# Patient Record
Sex: FEMALE | Race: WHITE | NOT HISPANIC OR LATINO | Employment: FULL TIME | URBAN - METROPOLITAN AREA
[De-identification: names, ages, dates, MRNs, and addresses within clinical notes are randomized per-mention and may not be internally consistent; named-entity substitution may affect disease eponyms.]

---

## 2018-04-03 ENCOUNTER — OFFICE VISIT CONVERTED (OUTPATIENT)
Dept: SURGERY | Facility: CLINIC | Age: 48
End: 2018-04-03
Attending: SURGERY

## 2018-06-11 ENCOUNTER — OFFICE VISIT CONVERTED (OUTPATIENT)
Dept: SURGERY | Facility: CLINIC | Age: 48
End: 2018-06-11
Attending: SURGERY

## 2018-06-11 ENCOUNTER — CONVERSION ENCOUNTER (OUTPATIENT)
Dept: SURGERY | Facility: CLINIC | Age: 48
End: 2018-06-11

## 2019-12-10 ENCOUNTER — HOSPITAL ENCOUNTER (OUTPATIENT)
Dept: GENERAL RADIOLOGY | Facility: HOSPITAL | Age: 49
Discharge: HOME | End: 2019-12-10

## 2021-05-16 VITALS — RESPIRATION RATE: 16 BRPM | BODY MASS INDEX: 44.41 KG/M2 | HEIGHT: 68 IN | WEIGHT: 293 LBS

## 2021-05-16 VITALS — BODY MASS INDEX: 44.41 KG/M2 | HEIGHT: 68 IN | RESPIRATION RATE: 16 BRPM | WEIGHT: 293 LBS

## 2021-12-16 ENCOUNTER — TRANSCRIBE ORDERS (OUTPATIENT)
Dept: ADMINISTRATIVE | Facility: HOSPITAL | Age: 51
End: 2021-12-16

## 2021-12-16 DIAGNOSIS — R91.1 LUNG NODULE: Primary | ICD-10-CM

## 2021-12-27 ENCOUNTER — HOSPITAL ENCOUNTER (OUTPATIENT)
Dept: CT IMAGING | Facility: HOSPITAL | Age: 51
Discharge: HOME | End: 2021-12-27
Admitting: NURSE PRACTITIONER

## 2021-12-27 DIAGNOSIS — R91.1 LUNG NODULE: ICD-10-CM

## 2021-12-27 PROCEDURE — 71250 CT THORAX DX C-: CPT

## 2022-06-10 ENCOUNTER — TRANSCRIBE ORDERS (OUTPATIENT)
Dept: ADMINISTRATIVE | Facility: HOSPITAL | Age: 52
End: 2022-06-10

## 2022-06-10 DIAGNOSIS — R10.11 RIGHT UPPER QUADRANT PAIN: Primary | ICD-10-CM

## 2022-06-21 ENCOUNTER — HOSPITAL ENCOUNTER (OUTPATIENT)
Dept: ULTRASOUND IMAGING | Facility: HOSPITAL | Age: 52
Discharge: HOME | End: 2022-06-21
Admitting: NURSE PRACTITIONER

## 2022-06-21 DIAGNOSIS — R10.11 RIGHT UPPER QUADRANT PAIN: ICD-10-CM

## 2022-06-21 PROCEDURE — 76705 ECHO EXAM OF ABDOMEN: CPT

## 2022-07-05 ENCOUNTER — TELEPHONE (OUTPATIENT)
Dept: SURGERY | Facility: CLINIC | Age: 52
End: 2022-07-05

## 2022-07-05 ENCOUNTER — APPOINTMENT (OUTPATIENT)
Dept: ULTRASOUND IMAGING | Facility: HOSPITAL | Age: 52
End: 2022-07-05

## 2022-07-05 ENCOUNTER — HOSPITAL ENCOUNTER (INPATIENT)
Facility: HOSPITAL | Age: 52
LOS: 5 days | Discharge: HOME | End: 2022-07-11
Attending: EMERGENCY MEDICINE | Admitting: INTERNAL MEDICINE

## 2022-07-05 DIAGNOSIS — K80.01 CALCULUS OF GALLBLADDER WITH ACUTE CHOLECYSTITIS AND OBSTRUCTION: Primary | ICD-10-CM

## 2022-07-05 DIAGNOSIS — K85.10 ACUTE BILIARY PANCREATITIS WITHOUT INFECTION OR NECROSIS: ICD-10-CM

## 2022-07-05 DIAGNOSIS — R26.2 DIFFICULTY WALKING: ICD-10-CM

## 2022-07-05 DIAGNOSIS — K85.10 ACUTE BILIARY PANCREATITIS, UNSPECIFIED COMPLICATION STATUS: ICD-10-CM

## 2022-07-05 DIAGNOSIS — R10.30 LOWER ABDOMINAL PAIN: ICD-10-CM

## 2022-07-05 LAB
ALBUMIN SERPL-MCNC: 4.6 G/DL (ref 3.5–5.2)
ALBUMIN/GLOB SERPL: 1.3 G/DL
ALP SERPL-CCNC: 273 U/L (ref 39–117)
ALT SERPL W P-5'-P-CCNC: 109 U/L (ref 1–33)
ANION GAP SERPL CALCULATED.3IONS-SCNC: 12.5 MMOL/L (ref 5–15)
AST SERPL-CCNC: 131 U/L (ref 1–32)
BASOPHILS # BLD AUTO: 0.03 10*3/MM3 (ref 0–0.2)
BASOPHILS NFR BLD AUTO: 0.3 % (ref 0–1.5)
BILIRUB SERPL-MCNC: 2.7 MG/DL (ref 0–1.2)
BILIRUB UR QL STRIP: NEGATIVE
BUN SERPL-MCNC: 8 MG/DL (ref 6–20)
BUN/CREAT SERPL: 9.4 (ref 7–25)
CALCIUM SPEC-SCNC: 9.9 MG/DL (ref 8.6–10.5)
CHLORIDE SERPL-SCNC: 95 MMOL/L (ref 98–107)
CLARITY UR: CLEAR
CO2 SERPL-SCNC: 24.5 MMOL/L (ref 22–29)
COLOR UR: ABNORMAL
CREAT SERPL-MCNC: 0.85 MG/DL (ref 0.57–1)
D-LACTATE SERPL-SCNC: 1.8 MMOL/L (ref 0.5–2)
DEPRECATED RDW RBC AUTO: 43 FL (ref 37–54)
EGFRCR SERPLBLD CKD-EPI 2021: 83.6 ML/MIN/1.73
EOSINOPHIL # BLD AUTO: 0 10*3/MM3 (ref 0–0.4)
EOSINOPHIL NFR BLD AUTO: 0 % (ref 0.3–6.2)
ERYTHROCYTE [DISTWIDTH] IN BLOOD BY AUTOMATED COUNT: 13.4 % (ref 12.3–15.4)
GLOBULIN UR ELPH-MCNC: 3.5 GM/DL
GLUCOSE SERPL-MCNC: 360 MG/DL (ref 65–99)
GLUCOSE UR STRIP-MCNC: ABNORMAL MG/DL
HCG INTACT+B SERPL-ACNC: <0.5 MIU/ML
HCT VFR BLD AUTO: 47.9 % (ref 34–46.6)
HGB BLD-MCNC: 15.7 G/DL (ref 12–15.9)
HGB UR QL STRIP.AUTO: NEGATIVE
HOLD SPECIMEN: NORMAL
HOLD SPECIMEN: NORMAL
IMM GRANULOCYTES # BLD AUTO: 0.03 10*3/MM3 (ref 0–0.05)
IMM GRANULOCYTES NFR BLD AUTO: 0.3 % (ref 0–0.5)
KETONES UR QL STRIP: ABNORMAL
LEUKOCYTE ESTERASE UR QL STRIP.AUTO: NEGATIVE
LIPASE SERPL-CCNC: >3000 U/L (ref 13–60)
LYMPHOCYTES # BLD AUTO: 0.86 10*3/MM3 (ref 0.7–3.1)
LYMPHOCYTES NFR BLD AUTO: 7.8 % (ref 19.6–45.3)
MCH RBC QN AUTO: 28.7 PG (ref 26.6–33)
MCHC RBC AUTO-ENTMCNC: 32.8 G/DL (ref 31.5–35.7)
MCV RBC AUTO: 87.6 FL (ref 79–97)
MONOCYTES # BLD AUTO: 0.26 10*3/MM3 (ref 0.1–0.9)
MONOCYTES NFR BLD AUTO: 2.3 % (ref 5–12)
NEUTROPHILS NFR BLD AUTO: 89.3 % (ref 42.7–76)
NEUTROPHILS NFR BLD AUTO: 9.89 10*3/MM3 (ref 1.7–7)
NITRITE UR QL STRIP: NEGATIVE
NRBC BLD AUTO-RTO: 0 /100 WBC (ref 0–0.2)
PH UR STRIP.AUTO: 7 [PH] (ref 5–8)
PLATELET # BLD AUTO: 303 10*3/MM3 (ref 140–450)
PMV BLD AUTO: 10.7 FL (ref 6–12)
POTASSIUM SERPL-SCNC: 4.9 MMOL/L (ref 3.5–5.2)
PROT SERPL-MCNC: 8.1 G/DL (ref 6–8.5)
PROT UR QL STRIP: ABNORMAL
RBC # BLD AUTO: 5.47 10*6/MM3 (ref 3.77–5.28)
SODIUM SERPL-SCNC: 132 MMOL/L (ref 136–145)
SP GR UR STRIP: >1.03 (ref 1–1.03)
UROBILINOGEN UR QL STRIP: ABNORMAL
WBC NRBC COR # BLD: 11.07 10*3/MM3 (ref 3.4–10.8)
WHOLE BLOOD HOLD COAG: NORMAL
WHOLE BLOOD HOLD SPECIMEN: NORMAL

## 2022-07-05 PROCEDURE — 76705 ECHO EXAM OF ABDOMEN: CPT

## 2022-07-05 PROCEDURE — 85025 COMPLETE CBC W/AUTO DIFF WBC: CPT

## 2022-07-05 PROCEDURE — 36415 COLL VENOUS BLD VENIPUNCTURE: CPT

## 2022-07-05 PROCEDURE — 80053 COMPREHEN METABOLIC PANEL: CPT

## 2022-07-05 PROCEDURE — 84702 CHORIONIC GONADOTROPIN TEST: CPT

## 2022-07-05 PROCEDURE — 81003 URINALYSIS AUTO W/O SCOPE: CPT

## 2022-07-05 PROCEDURE — 83605 ASSAY OF LACTIC ACID: CPT

## 2022-07-05 PROCEDURE — 83690 ASSAY OF LIPASE: CPT

## 2022-07-05 PROCEDURE — 99284 EMERGENCY DEPT VISIT MOD MDM: CPT

## 2022-07-05 RX ORDER — SODIUM CHLORIDE 0.9 % (FLUSH) 0.9 %
10 SYRINGE (ML) INJECTION AS NEEDED
Status: DISCONTINUED | OUTPATIENT
Start: 2022-07-05 | End: 2022-07-08

## 2022-07-05 NOTE — TELEPHONE ENCOUNTER
Caller: Wilma Colmenares    Relationship to patient: Self    Best call back number: [phone]    Patient is needing: PT IS REQUESTING A SOONER APPT W/ DR BATISTA. PT ADVISED SHE HAS BEEN EXPERIENCING A LOT OF PAIN WITH HER GALL STONES. HER CURRENT APPT IS NOT UNTIL 7/20.     PT ADVISED THE HIDA SCAN ON 7/18 IS NO LONGER NEEDED. SHE SAID THE US PERFORMED ON 6/21 INDICATED GALL STONES.    ATTEMPTED TO TRANSFER; NO ANSWER AT PRACTICE    PT CAN BE REACHED ANYTIME; OKAY TO LEAVE MESSAGE IF NO ANSWER

## 2022-07-06 ENCOUNTER — APPOINTMENT (OUTPATIENT)
Dept: MRI IMAGING | Facility: HOSPITAL | Age: 52
End: 2022-07-06

## 2022-07-06 ENCOUNTER — APPOINTMENT (OUTPATIENT)
Dept: CT IMAGING | Facility: HOSPITAL | Age: 52
End: 2022-07-06

## 2022-07-06 PROBLEM — K85.90 ACUTE PANCREATITIS: Status: ACTIVE | Noted: 2022-07-06

## 2022-07-06 PROBLEM — D72.829 LEUKOCYTOSIS: Status: ACTIVE | Noted: 2022-07-06

## 2022-07-06 PROBLEM — R79.89 ELEVATED LFTS: Status: ACTIVE | Noted: 2022-07-06

## 2022-07-06 PROBLEM — K80.01 CALCULUS OF GALLBLADDER WITH ACUTE CHOLECYSTITIS AND OBSTRUCTION: Status: ACTIVE | Noted: 2022-07-06

## 2022-07-06 LAB
ALBUMIN SERPL-MCNC: 4 G/DL (ref 3.5–5.2)
ALBUMIN/GLOB SERPL: 1.3 G/DL
ALP SERPL-CCNC: 218 U/L (ref 39–117)
ALT SERPL W P-5'-P-CCNC: 83 U/L (ref 1–33)
ANION GAP SERPL CALCULATED.3IONS-SCNC: 10.9 MMOL/L (ref 5–15)
AST SERPL-CCNC: 72 U/L (ref 1–32)
BASOPHILS # BLD AUTO: 0.03 10*3/MM3 (ref 0–0.2)
BASOPHILS NFR BLD AUTO: 0.2 % (ref 0–1.5)
BILIRUB SERPL-MCNC: 1.5 MG/DL (ref 0–1.2)
BUN SERPL-MCNC: 9 MG/DL (ref 6–20)
BUN/CREAT SERPL: 12.3 (ref 7–25)
CALCIUM SPEC-SCNC: 9 MG/DL (ref 8.6–10.5)
CHLORIDE SERPL-SCNC: 99 MMOL/L (ref 98–107)
CO2 SERPL-SCNC: 22.1 MMOL/L (ref 22–29)
CREAT SERPL-MCNC: 0.73 MG/DL (ref 0.57–1)
DEPRECATED RDW RBC AUTO: 44.4 FL (ref 37–54)
EGFRCR SERPLBLD CKD-EPI 2021: 100.3 ML/MIN/1.73
EOSINOPHIL # BLD AUTO: 0.02 10*3/MM3 (ref 0–0.4)
EOSINOPHIL NFR BLD AUTO: 0.1 % (ref 0.3–6.2)
ERYTHROCYTE [DISTWIDTH] IN BLOOD BY AUTOMATED COUNT: 13.9 % (ref 12.3–15.4)
GLOBULIN UR ELPH-MCNC: 3 GM/DL
GLUCOSE BLDC GLUCOMTR-MCNC: 190 MG/DL (ref 70–99)
GLUCOSE BLDC GLUCOMTR-MCNC: 194 MG/DL (ref 70–99)
GLUCOSE BLDC GLUCOMTR-MCNC: 239 MG/DL (ref 70–99)
GLUCOSE BLDC GLUCOMTR-MCNC: 240 MG/DL (ref 70–99)
GLUCOSE SERPL-MCNC: 235 MG/DL (ref 65–99)
HBA1C MFR BLD: 9 % (ref 4.8–5.6)
HCT VFR BLD AUTO: 43.1 % (ref 34–46.6)
HGB BLD-MCNC: 14.1 G/DL (ref 12–15.9)
IMM GRANULOCYTES # BLD AUTO: 0.05 10*3/MM3 (ref 0–0.05)
IMM GRANULOCYTES NFR BLD AUTO: 0.4 % (ref 0–0.5)
LYMPHOCYTES # BLD AUTO: 1.32 10*3/MM3 (ref 0.7–3.1)
LYMPHOCYTES NFR BLD AUTO: 9.4 % (ref 19.6–45.3)
MCH RBC QN AUTO: 29 PG (ref 26.6–33)
MCHC RBC AUTO-ENTMCNC: 32.7 G/DL (ref 31.5–35.7)
MCV RBC AUTO: 88.5 FL (ref 79–97)
MONOCYTES # BLD AUTO: 0.85 10*3/MM3 (ref 0.1–0.9)
MONOCYTES NFR BLD AUTO: 6.1 % (ref 5–12)
NEUTROPHILS NFR BLD AUTO: 11.71 10*3/MM3 (ref 1.7–7)
NEUTROPHILS NFR BLD AUTO: 83.8 % (ref 42.7–76)
NRBC BLD AUTO-RTO: 0 /100 WBC (ref 0–0.2)
PLATELET # BLD AUTO: 266 10*3/MM3 (ref 140–450)
PMV BLD AUTO: 11.2 FL (ref 6–12)
POTASSIUM SERPL-SCNC: 4.2 MMOL/L (ref 3.5–5.2)
PROT SERPL-MCNC: 7 G/DL (ref 6–8.5)
RBC # BLD AUTO: 4.87 10*6/MM3 (ref 3.77–5.28)
SARS-COV-2 RNA PNL SPEC NAA+PROBE: NOT DETECTED
SODIUM SERPL-SCNC: 132 MMOL/L (ref 136–145)
WBC NRBC COR # BLD: 13.98 10*3/MM3 (ref 3.4–10.8)

## 2022-07-06 PROCEDURE — 82962 GLUCOSE BLOOD TEST: CPT

## 2022-07-06 PROCEDURE — 25010000002 DIPHENHYDRAMINE PER 50 MG: Performed by: NURSE PRACTITIONER

## 2022-07-06 PROCEDURE — 63710000001 INSULIN LISPRO (HUMAN) PER 5 UNITS: Performed by: INTERNAL MEDICINE

## 2022-07-06 PROCEDURE — 80053 COMPREHEN METABOLIC PANEL: CPT | Performed by: HOSPITALIST

## 2022-07-06 PROCEDURE — 0 IOPAMIDOL PER 1 ML: Performed by: EMERGENCY MEDICINE

## 2022-07-06 PROCEDURE — 99223 1ST HOSP IP/OBS HIGH 75: CPT | Performed by: HOSPITALIST

## 2022-07-06 PROCEDURE — 25010000002 PIPERACILLIN SOD-TAZOBACTAM PER 1 G: Performed by: NURSE PRACTITIONER

## 2022-07-06 PROCEDURE — 83036 HEMOGLOBIN GLYCOSYLATED A1C: CPT | Performed by: HOSPITALIST

## 2022-07-06 PROCEDURE — 25010000002 ONDANSETRON PER 1 MG: Performed by: EMERGENCY MEDICINE

## 2022-07-06 PROCEDURE — 25010000002 DROPERIDOL PER 5 MG: Performed by: NURSE PRACTITIONER

## 2022-07-06 PROCEDURE — 25010000002 ONDANSETRON PER 1 MG: Performed by: HOSPITALIST

## 2022-07-06 PROCEDURE — 85025 COMPLETE CBC W/AUTO DIFF WBC: CPT | Performed by: HOSPITALIST

## 2022-07-06 PROCEDURE — 25010000002 HYDROMORPHONE 1 MG/ML SOLUTION: Performed by: HOSPITALIST

## 2022-07-06 PROCEDURE — 74181 MRI ABDOMEN W/O CONTRAST: CPT

## 2022-07-06 PROCEDURE — 25010000002 PIPERACILLIN SOD-TAZOBACTAM PER 1 G: Performed by: HOSPITALIST

## 2022-07-06 PROCEDURE — U0004 COV-19 TEST NON-CDC HGH THRU: HCPCS | Performed by: INTERNAL MEDICINE

## 2022-07-06 PROCEDURE — 99222 1ST HOSP IP/OBS MODERATE 55: CPT | Performed by: INTERNAL MEDICINE

## 2022-07-06 PROCEDURE — 25010000002 ENOXAPARIN PER 10 MG: Performed by: HOSPITALIST

## 2022-07-06 PROCEDURE — 99221 1ST HOSP IP/OBS SF/LOW 40: CPT | Performed by: SURGERY

## 2022-07-06 PROCEDURE — 74177 CT ABD & PELVIS W/CONTRAST: CPT

## 2022-07-06 PROCEDURE — 25010000002 HYDROMORPHONE 1 MG/ML SOLUTION: Performed by: EMERGENCY MEDICINE

## 2022-07-06 RX ORDER — SODIUM CHLORIDE 9 MG/ML
INJECTION, SOLUTION INTRAVENOUS
Status: DISPENSED
Start: 2022-07-06 | End: 2022-07-06

## 2022-07-06 RX ORDER — CHOLECALCIFEROL (VITAMIN D3) 125 MCG
5 CAPSULE ORAL NIGHTLY PRN
Status: DISCONTINUED | OUTPATIENT
Start: 2022-07-06 | End: 2022-07-11 | Stop reason: HOSPADM

## 2022-07-06 RX ORDER — ONDANSETRON 2 MG/ML
4 INJECTION INTRAMUSCULAR; INTRAVENOUS ONCE
Status: COMPLETED | OUTPATIENT
Start: 2022-07-06 | End: 2022-07-06

## 2022-07-06 RX ORDER — METFORMIN HYDROCHLORIDE 500 MG/1
500 TABLET, EXTENDED RELEASE ORAL 2 TIMES DAILY
Status: ON HOLD | COMMUNITY
End: 2022-07-06

## 2022-07-06 RX ORDER — HYDRALAZINE HYDROCHLORIDE 20 MG/ML
10 INJECTION INTRAMUSCULAR; INTRAVENOUS EVERY 6 HOURS PRN
Status: DISCONTINUED | OUTPATIENT
Start: 2022-07-06 | End: 2022-07-08

## 2022-07-06 RX ORDER — SACCHAROMYCES BOULARDII 250 MG
250 CAPSULE ORAL 2 TIMES DAILY
Status: DISCONTINUED | OUTPATIENT
Start: 2022-07-06 | End: 2022-07-11 | Stop reason: HOSPADM

## 2022-07-06 RX ORDER — DIPHENHYDRAMINE HYDROCHLORIDE 50 MG/ML
25 INJECTION INTRAMUSCULAR; INTRAVENOUS ONCE
Status: COMPLETED | OUTPATIENT
Start: 2022-07-06 | End: 2022-07-06

## 2022-07-06 RX ORDER — SODIUM CHLORIDE 0.9 % (FLUSH) 0.9 %
10 SYRINGE (ML) INJECTION EVERY 12 HOURS SCHEDULED
Status: DISCONTINUED | OUTPATIENT
Start: 2022-07-06 | End: 2022-07-11 | Stop reason: HOSPADM

## 2022-07-06 RX ORDER — LANOLIN ALCOHOL/MO/W.PET/CERES
1000 CREAM (GRAM) TOPICAL DAILY
COMMUNITY

## 2022-07-06 RX ORDER — AMOXICILLIN 250 MG
2 CAPSULE ORAL 2 TIMES DAILY
Status: DISCONTINUED | OUTPATIENT
Start: 2022-07-06 | End: 2022-07-11 | Stop reason: HOSPADM

## 2022-07-06 RX ORDER — SODIUM CHLORIDE 0.9 % (FLUSH) 0.9 %
10 SYRINGE (ML) INJECTION AS NEEDED
Status: DISCONTINUED | OUTPATIENT
Start: 2022-07-06 | End: 2022-07-08

## 2022-07-06 RX ORDER — ONDANSETRON 2 MG/ML
INJECTION INTRAMUSCULAR; INTRAVENOUS
Status: DISPENSED
Start: 2022-07-06 | End: 2022-07-06

## 2022-07-06 RX ORDER — GLIPIZIDE 5 MG/1
5 TABLET, FILM COATED, EXTENDED RELEASE ORAL DAILY
COMMUNITY

## 2022-07-06 RX ORDER — VENLAFAXINE 25 MG/1
50 TABLET ORAL DAILY
Status: DISCONTINUED | OUTPATIENT
Start: 2022-07-06 | End: 2022-07-11 | Stop reason: HOSPADM

## 2022-07-06 RX ORDER — NALOXONE HCL 0.4 MG/ML
0.4 VIAL (ML) INJECTION
Status: DISCONTINUED | OUTPATIENT
Start: 2022-07-06 | End: 2022-07-11 | Stop reason: HOSPADM

## 2022-07-06 RX ORDER — DESVENLAFAXINE SUCCINATE 50 MG/1
50 TABLET, EXTENDED RELEASE ORAL DAILY
COMMUNITY

## 2022-07-06 RX ORDER — DEXTROSE MONOHYDRATE 25 G/50ML
25 INJECTION, SOLUTION INTRAVENOUS
Status: DISCONTINUED | OUTPATIENT
Start: 2022-07-06 | End: 2022-07-11 | Stop reason: HOSPADM

## 2022-07-06 RX ORDER — VALSARTAN 80 MG/1
80 TABLET ORAL DAILY
COMMUNITY
End: 2022-07-19 | Stop reason: HOSPADM

## 2022-07-06 RX ORDER — SODIUM CHLORIDE 9 MG/ML
200 INJECTION, SOLUTION INTRAVENOUS CONTINUOUS
Status: ACTIVE | OUTPATIENT
Start: 2022-07-06 | End: 2022-07-07

## 2022-07-06 RX ORDER — BISACODYL 10 MG
10 SUPPOSITORY, RECTAL RECTAL DAILY PRN
Status: DISCONTINUED | OUTPATIENT
Start: 2022-07-06 | End: 2022-07-11 | Stop reason: HOSPADM

## 2022-07-06 RX ORDER — ONDANSETRON 2 MG/ML
4 INJECTION INTRAMUSCULAR; INTRAVENOUS EVERY 6 HOURS PRN
Status: DISCONTINUED | OUTPATIENT
Start: 2022-07-06 | End: 2022-07-11 | Stop reason: HOSPADM

## 2022-07-06 RX ORDER — DROPERIDOL 2.5 MG/ML
INJECTION, SOLUTION INTRAMUSCULAR; INTRAVENOUS
Status: DISPENSED
Start: 2022-07-06 | End: 2022-07-06

## 2022-07-06 RX ORDER — DROPERIDOL 2.5 MG/ML
1.25 INJECTION, SOLUTION INTRAMUSCULAR; INTRAVENOUS ONCE
Status: COMPLETED | OUTPATIENT
Start: 2022-07-06 | End: 2022-07-06

## 2022-07-06 RX ORDER — INSULIN LISPRO 100 [IU]/ML
0-9 INJECTION, SOLUTION INTRAVENOUS; SUBCUTANEOUS
Status: DISCONTINUED | OUTPATIENT
Start: 2022-07-06 | End: 2022-07-10

## 2022-07-06 RX ORDER — DIPHENHYDRAMINE HYDROCHLORIDE 50 MG/ML
INJECTION INTRAMUSCULAR; INTRAVENOUS
Status: DISPENSED
Start: 2022-07-06 | End: 2022-07-06

## 2022-07-06 RX ORDER — ENOXAPARIN SODIUM 100 MG/ML
40 INJECTION SUBCUTANEOUS 2 TIMES DAILY
Status: DISCONTINUED | OUTPATIENT
Start: 2022-07-06 | End: 2022-07-11 | Stop reason: HOSPADM

## 2022-07-06 RX ORDER — VALSARTAN 80 MG/1
80 TABLET ORAL DAILY
Status: DISCONTINUED | OUTPATIENT
Start: 2022-07-06 | End: 2022-07-11 | Stop reason: HOSPADM

## 2022-07-06 RX ORDER — SODIUM CHLORIDE 9 MG/ML
40 INJECTION, SOLUTION INTRAVENOUS AS NEEDED
Status: DISCONTINUED | OUTPATIENT
Start: 2022-07-06 | End: 2022-07-11 | Stop reason: HOSPADM

## 2022-07-06 RX ORDER — FOLIC ACID 1 MG/1
1 TABLET ORAL DAILY
COMMUNITY

## 2022-07-06 RX ORDER — BISACODYL 5 MG/1
5 TABLET, DELAYED RELEASE ORAL DAILY PRN
Status: DISCONTINUED | OUTPATIENT
Start: 2022-07-06 | End: 2022-07-11 | Stop reason: HOSPADM

## 2022-07-06 RX ORDER — NICOTINE POLACRILEX 4 MG
15 LOZENGE BUCCAL
Status: DISCONTINUED | OUTPATIENT
Start: 2022-07-06 | End: 2022-07-11 | Stop reason: HOSPADM

## 2022-07-06 RX ORDER — POLYETHYLENE GLYCOL 3350 17 G/17G
17 POWDER, FOR SOLUTION ORAL DAILY PRN
Status: DISCONTINUED | OUTPATIENT
Start: 2022-07-06 | End: 2022-07-11 | Stop reason: HOSPADM

## 2022-07-06 RX ORDER — ONDANSETRON 4 MG/1
4 TABLET, FILM COATED ORAL EVERY 6 HOURS PRN
Status: DISCONTINUED | OUTPATIENT
Start: 2022-07-06 | End: 2022-07-11 | Stop reason: HOSPADM

## 2022-07-06 RX ADMIN — VENLAFAXINE 50 MG: 25 TABLET ORAL at 09:54

## 2022-07-06 RX ADMIN — ONDANSETRON 4 MG: 2 INJECTION INTRAMUSCULAR; INTRAVENOUS at 06:45

## 2022-07-06 RX ADMIN — INSULIN LISPRO 2 UNITS: 100 INJECTION, SOLUTION INTRAVENOUS; SUBCUTANEOUS at 17:29

## 2022-07-06 RX ADMIN — INSULIN LISPRO 4 UNITS: 100 INJECTION, SOLUTION INTRAVENOUS; SUBCUTANEOUS at 12:12

## 2022-07-06 RX ADMIN — DIPHENHYDRAMINE HYDROCHLORIDE 25 MG: 50 INJECTION, SOLUTION INTRAMUSCULAR; INTRAVENOUS at 02:46

## 2022-07-06 RX ADMIN — HYDROMORPHONE HYDROCHLORIDE 1 MG: 1 INJECTION, SOLUTION INTRAMUSCULAR; INTRAVENOUS; SUBCUTANEOUS at 10:27

## 2022-07-06 RX ADMIN — ONDANSETRON 4 MG: 2 INJECTION INTRAMUSCULAR; INTRAVENOUS at 00:42

## 2022-07-06 RX ADMIN — HYDROMORPHONE HYDROCHLORIDE 1 MG: 1 INJECTION, SOLUTION INTRAMUSCULAR; INTRAVENOUS; SUBCUTANEOUS at 21:25

## 2022-07-06 RX ADMIN — IOPAMIDOL 100 ML: 755 INJECTION, SOLUTION INTRAVENOUS at 01:49

## 2022-07-06 RX ADMIN — Medication 10 ML: at 09:55

## 2022-07-06 RX ADMIN — ONDANSETRON 4 MG: 2 INJECTION INTRAMUSCULAR; INTRAVENOUS at 21:26

## 2022-07-06 RX ADMIN — ENOXAPARIN SODIUM 40 MG: 100 INJECTION SUBCUTANEOUS at 10:26

## 2022-07-06 RX ADMIN — SENNOSIDES AND DOCUSATE SODIUM 2 TABLET: 50; 8.6 TABLET ORAL at 20:16

## 2022-07-06 RX ADMIN — VALSARTAN 80 MG: 80 TABLET, FILM COATED ORAL at 09:54

## 2022-07-06 RX ADMIN — SODIUM CHLORIDE 1000 ML: 9 INJECTION, SOLUTION INTRAVENOUS at 02:46

## 2022-07-06 RX ADMIN — PIPERACILLIN SODIUM,TAZOBACTAM SODIUM 4.5 G: 4; .5 INJECTION, POWDER, FOR SOLUTION INTRAVENOUS at 03:59

## 2022-07-06 RX ADMIN — HYDROMORPHONE HYDROCHLORIDE 1 MG: 1 INJECTION, SOLUTION INTRAMUSCULAR; INTRAVENOUS; SUBCUTANEOUS at 17:15

## 2022-07-06 RX ADMIN — PIPERACILLIN SODIUM AND TAZOBACTAM SODIUM 4.5 G: 4; 500 INJECTION, POWDER, FOR SOLUTION INTRAVENOUS at 17:15

## 2022-07-06 RX ADMIN — SODIUM CHLORIDE 1000 ML: 9 INJECTION, SOLUTION INTRAVENOUS at 00:41

## 2022-07-06 RX ADMIN — ENOXAPARIN SODIUM 40 MG: 100 INJECTION SUBCUTANEOUS at 20:15

## 2022-07-06 RX ADMIN — PIPERACILLIN SODIUM AND TAZOBACTAM SODIUM 4.5 G: 4; 500 INJECTION, POWDER, FOR SOLUTION INTRAVENOUS at 09:55

## 2022-07-06 RX ADMIN — Medication 250 MG: at 20:16

## 2022-07-06 RX ADMIN — SODIUM CHLORIDE 200 ML/HR: 9 INJECTION, SOLUTION INTRAVENOUS at 13:44

## 2022-07-06 RX ADMIN — HYDROMORPHONE HYDROCHLORIDE 1 MG: 1 INJECTION, SOLUTION INTRAMUSCULAR; INTRAVENOUS; SUBCUTANEOUS at 00:42

## 2022-07-06 RX ADMIN — SODIUM CHLORIDE 200 ML/HR: 9 INJECTION, SOLUTION INTRAVENOUS at 19:07

## 2022-07-06 RX ADMIN — HYDROMORPHONE HYDROCHLORIDE 0.5 MG: 1 INJECTION, SOLUTION INTRAMUSCULAR; INTRAVENOUS; SUBCUTANEOUS at 04:16

## 2022-07-06 RX ADMIN — SODIUM CHLORIDE 125 ML/HR: 9 INJECTION, SOLUTION INTRAVENOUS at 06:30

## 2022-07-06 RX ADMIN — DROPERIDOL 1.25 MG: 2.5 INJECTION, SOLUTION INTRAMUSCULAR; INTRAVENOUS at 02:46

## 2022-07-06 RX ADMIN — HYDROMORPHONE HYDROCHLORIDE 1 MG: 1 INJECTION, SOLUTION INTRAMUSCULAR; INTRAVENOUS; SUBCUTANEOUS at 06:45

## 2022-07-06 RX ADMIN — HYDROMORPHONE HYDROCHLORIDE 1 MG: 1 INJECTION, SOLUTION INTRAMUSCULAR; INTRAVENOUS; SUBCUTANEOUS at 13:44

## 2022-07-06 NOTE — CASE MANAGEMENT/SOCIAL WORK
Discharge Planning Assessment   [facility]     Patient Name: Wilma Colmenares  MRN: [id number]  Today's Date: 7/6/2022    Admit Date: 7/5/2022     Discharge Needs Assessment     Row Name 07/06/22 1356       Living Environment    People in Home significant other    Current Living Arrangements apartment    Primary Care Provided by self    Provides Primary Care For no one    Family Caregiver if Needed significant other    Quality of Family Relationships helpful;involved;supportive    Able to Return to Prior Arrangements yes       Resource/Environmental Concerns    Resource/Environmental Concerns none    Transportation Concerns none       Transition Planning    Patient/Family Anticipates Transition to home with family    Patient/Family Anticipated Services at Transition none    Transportation Anticipated family or friend will provide       Discharge Needs Assessment    Readmission Within the Last 30 Days no previous admission in last 30 days    Equipment Currently Used at Home pulse ox    Concerns to be Addressed no discharge needs identified    Anticipated Changes Related to Illness inability to work    Equipment Needed After Discharge none               Discharge Plan     Row Name 07/06/22 1525       Plan    Plan CM assessment completed at bedside with patient.  CM role explained and discharge planning discussed. Face sheet, PCP and Pharmacy verified and updated. Patient states is current with PCP. Patient is independent and lives with significant other. Patient states SO is able to help as needed. No DME or HHC needs assessed.              Continued Care and Services - Admitted Since 7/5/2022    Coordination has not been started for this encounter.          Demographic Summary     Row Name 07/06/22 1265       General Information    Admission Type inpatient    Arrived From emergency department    Referral Source admission list    Reason for Consult discharge planning    Preferred Language English       Contact  Information    Permission Granted to Share Info With family/designee               Functional Status     Row Name 07/06/22 1819       Functional Status    Usual Activity Tolerance moderate    Current Activity Tolerance poor       Functional Status, IADL    Medications independent    Meal Preparation independent    Housekeeping independent    Shopping independent       Mental Status    General Appearance WDL WDL       Mental Status Summary    Recent Changes in Mental Status/Cognitive Functioning no changes       Employment/    Employment Status employed full-time               Psychosocial    No documentation.                Abuse/Neglect    No documentation.                Legal    No documentation.                Substance Abuse    No documentation.                Patient Forms    No documentation.                   Bebe Estrada

## 2022-07-06 NOTE — PLAN OF CARE
Goal Outcome Evaluation:            Patient's abdominal pain has been controlled with PRN dilaudid. IV fluids and antibiotics infusing. Remains NPO status- blood sugars checked. Ambulating to the bathroom with stand-by assistance. Vitals stable.

## 2022-07-06 NOTE — ED PROVIDER NOTES
Subjective   The patient presents emergency department states that she is having right and left upper abdominal pain.  Patient has been seen her primary care provider and has had an outpatient ultrasound and has a HIDA scan scheduled.  She has been diagnosed with gallstones and has had intermittent pain on and off for 4 months.  She states that last month her symptoms got significantly worse.  She states that is when the ordered the outpatient test.  She states that today she has been having left upper quadrant pain which is not the same as her right upper quadrant pain.  She states it has been very severe.  She has had nausea and vomiting.  She denies any recent fevers.  She denies any urinary symptoms.  She reports no chest pain.  She states she has had no shortness of breath or fever or cough.          Review of Systems   Constitutional: Negative for chills and fever.   HENT: Negative for congestion, ear pain and sore throat.    Eyes: Negative for pain.   Respiratory: Negative for cough, chest tightness, shortness of breath and wheezing.    Cardiovascular: Negative for chest pain.   Gastrointestinal: Positive for abdominal pain, nausea and vomiting. Negative for diarrhea.   Genitourinary: Negative for dysuria, flank pain, hematuria and urgency.   Musculoskeletal: Negative for joint swelling.   Skin: Negative for pallor and rash.   Neurological: Negative for seizures and headaches.   All other systems reviewed and are negative.      History reviewed. No pertinent past medical history.    No Known Allergies    History reviewed. No pertinent surgical history.    History reviewed. No pertinent family history.    Social History     Socioeconomic History   • Marital status:            Objective   Physical Exam  Vitals and nursing note reviewed.   Constitutional:       General: She is not in acute distress.     Appearance: Normal appearance. She is not toxic-appearing.   HENT:      Head: Normocephalic and  atraumatic.      Mouth/Throat:      Mouth: Mucous membranes are moist.   Eyes:      General: No scleral icterus.  Cardiovascular:      Rate and Rhythm: Normal rate and regular rhythm.      Pulses: Normal pulses.   Pulmonary:      Effort: Pulmonary effort is normal. No respiratory distress.      Breath sounds: Normal breath sounds. No wheezing.   Abdominal:      General: Abdomen is flat.      Palpations: Abdomen is soft.      Tenderness: There is abdominal tenderness in the right upper quadrant and left upper quadrant. There is no guarding or rebound.   Musculoskeletal:         General: Normal range of motion.      Cervical back: Normal range of motion and neck supple.   Skin:     General: Skin is warm and dry.      Capillary Refill: Capillary refill takes less than 2 seconds.   Neurological:      General: No focal deficit present.      Mental Status: She is alert and oriented to person, place, and time. Mental status is at baseline.   Psychiatric:         Mood and Affect: Mood normal.         Behavior: Behavior normal.         Procedures           ED Course  ED Course as of 07/06/22 0504   Wed Jul 06, 2022   0029 Glucose(!): 360 [TC]   0056 Narrative & Impression  PROCEDURE:  US GALLBLADDER     COMPARISON:     INDICATIONS:  upper right quadrant pain     TECHNIQUE:    A limited ultrasound examination of the right upper quadrant was performed.       FINDINGS:        The pancreas is sonographically unremarkable.   The liver is more hyperechoic than right   renal cortex.  The liver measures 19.8  cm  in length.   No focal masses are seen.  Hepatic and   portal veins are patent with normal directional flow. The gallbladder demonstrates multiple   gallstones.  The gallbladder wall is difficult to measure but measures approximately 3 millimeters.    Negative sonographic James sign  Common bile duct is 5 mm in size.   The right kidney measures   12.6 cm in length and is sonographically unremarkable.   No fluid collections  are seen in the right   upper quadrant.        IMPRESSION:                     Multiple gallstones are seen.  Gallbladder wall is difficult to measure due to numerous gallstones   shadowing but probably measures about 3 millimeters which is borderline thickened.  There is no   pericholecystic fluid and there is a negative sonographic James sign.     Hepatic steatosis.  Exam limited by bowel gas and underpenetration  TIRSO VAZQUEZ MD        [TC]   6885 I SPOKE WITH DR MARTINEZ AND SHE WILL ADMIT THE PT AND CONSULT WITH GI IN THE MORNING. I WILL PUT THE MRCP IN FOR IN THE MORNING. THE PT WAS MADE AWARE OF THE ADMISSION. [TC]      ED Course User Index  [TC] Yen Herring, APRN                                           MDM  Number of Diagnoses or Management Options  Acute biliary pancreatitis, unspecified complication status: new and requires workup  Calculus of gallbladder with acute cholecystitis and obstruction: established and worsening  Lower abdominal pain: established and worsening     Amount and/or Complexity of Data Reviewed  Clinical lab tests: reviewed  Tests in the radiology section of CPT®: reviewed    Risk of Complications, Morbidity, and/or Mortality  Presenting problems: moderate  Diagnostic procedures: moderate  Management options: moderate    Patient Progress  Patient progress: stable      Final diagnoses:   Calculus of gallbladder with acute cholecystitis and obstruction   Acute biliary pancreatitis, unspecified complication status   Lower abdominal pain       ED Disposition  ED Disposition     ED Disposition   Decision to Admit    Condition   --    Comment   Level of Care: Med/Surg [1]   Diagnosis: Calculus of gallbladder with acute cholecystitis and obstruction [574.01.ICD-9-CM]   Certification: I Certify That Inpatient Hospital Services Are Medically Necessary For Greater Than 2 Midnights               No follow-up provider specified.       Medication List      No changes were made to your  prescriptions during this visit.          Yen Herring APRN  07/06/22 0504       Yen Herring APRN  07/06/22 0506

## 2022-07-06 NOTE — CONSULTS
[facility]   Consult    Patient Name: Wilma Colmenares  : 1970  MRN: [id number]  Primary Care Physician:  Oumou Dacosta APRN  Date of admission: 2022    Subjective   Subjective     Chief Complaint: Abdominal pain    HPI: Wilma Colmenares is a 52 y.o. female who was admitted earlier this morning with acute onset abdominal pain.  She has known gallstones and I believe have been scheduled to see Dr. Juarez later this week in the office.  She developed worsening abdominal pain and came to the emergency room last night.  On lab test she was noted to have a lipase in excess of 3000 and had some elevated liver function test.  She has had a MRI and MRCP that showed evidence of acute pancreatitis and gallstones but no evidence of common bile duct stones.  She is still having a fair amount of pain currently.    Review of Systems   Respiratory: Negative for shortness of breath.    Cardiovascular: Negative for chest pain.   Gastrointestinal: Positive for abdominal pain.       Personal History     Past Medical History:   Diagnosis Date   • Arthritis    • Diabetes mellitus (HCC)    • Elevated cholesterol    • Hypertension    • Sleep apnea        Past Surgical History:   Procedure Laterality Date   • HERNIA REPAIR         Family History: family history is not on file. Otherwise pertinent FHx was reviewed and not pertinent to current issue.    Social History:  reports that she has never smoked. She does not have any smokeless tobacco history on file. She reports current alcohol use. She reports that she does not use drugs.    Home Medications:  Norethin-Eth Estrad-Fe Biphas, desvenlafaxine, folic acid, glipizide, metFORMIN, valsartan, vitamin B-12, and vitamin D3    Allergies:  Allergies   Allergen Reactions   • Demerol [Meperidine] Nausea And Vomiting       Objective    Objective     Vitals:   Temp:  [97.4 °F (36.3 °C)-98 °F (36.7 °C)] 97.9 °F (36.6 °C)  Heart Rate:  [78-97] 97  Resp:  [18-20] 18  BP:  (119-147)/(70-86) 119/70    Physical Exam  Constitutional:       General: She is not in acute distress.     Appearance: She is obese. She is not ill-appearing or toxic-appearing.   HENT:      Head: Normocephalic and atraumatic.   Cardiovascular:      Rate and Rhythm: Normal rate.   Pulmonary:      Effort: Pulmonary effort is normal.   Abdominal:      Palpations: Abdomen is soft.      Tenderness: There is abdominal tenderness in the epigastric area.   Musculoskeletal:         General: Normal range of motion.      Cervical back: Normal range of motion.   Skin:     General: Skin is warm.   Neurological:      General: No focal deficit present.      Mental Status: She is oriented to person, place, and time.   Psychiatric:         Mood and Affect: Mood normal.         Behavior: Behavior normal.          Result Review    Result Review:  I have personally reviewed the results from the time of this admission to 7/6/2022 16:40 EDT and agree with these findings:  [x]  Laboratory  []  Microbiology  []  Radiology  []  EKG/Telemetry   []  Cardiology/Vascular   []  Pathology  []  Old records  []  Other:  Most notable findings include:     Assessment & Plan   Assessment / Plan     Brief Patient Summary:  Wilma Colmenares is a 52 y.o. female who has gallstone pancreatitis.  At this point she does not look terribly ill.    Active Hospital Problems:  Active Hospital Problems    Diagnosis    • Calculus of gallbladder with acute cholecystitis and obstruction    • Elevated LFTs    • Acute pancreatitis    • Leukocytosis        Plan: I agree with the plan for inpatient admission and IV fluid resuscitation and bowel rest.  We will follow along and will plan on performing a laparoscopic cholecystectomy once her pancreatitis is better.    DVT prophylaxis:  Medical DVT prophylaxis orders are present.    CODE STATUS:    Code Status (Patient has no pulse and is not breathing): CPR (Attempt to Resuscitate)  Medical Interventions (Patient has  pulse or is breathing): Full Support      Admission Status:  I believe this patient meets inpatient status.    Electronically signed by Dandy Bonilla MD, 07/06/22, 4:40 PM EDT.

## 2022-07-06 NOTE — H&P
[facility] HISTORY AND PHYSICAL  Date: 2022   Patient Name: Wilma Colmenares  : 1970  MRN: [id number]  Primary Care Physician:  Oumou Dacosta APRN  Date of admission: 2022    Subjective   Subjective     Chief Complaint: Abdominal pain times a few months    HPI:    Wilma Colmenares is a 52 y.o. female with history of hypertension; presents with abdominal pain times a few months.  Patient states that she noted having right upper quadrant pain for the past few months.  Patient states that she initially had pain once a week.  Patient states that pain has progressed to the point that she is having it every day.  Patient states that towards the end of May 2022, she started to have it worked up.  Patient states that she had an ultrasound done and she was found to have gallstones.  Patient reports that she has been trying to coordinate with a surgeon in order to have her gallbladder removed.  Patient reports that this has been difficult secondary to surgeons going on vacation or having their office closed.  Patient reports that the pain became so severe on the day of admission, she did not feel that she could wait and reports that she came in in order to have her pain addressed.  Patient states that she has been having also epigastric pain radiating to her back.  Patient has noted having intermittent constipation and diarrhea.  Patient is also noted having nausea and vomiting over the past few days.  Patient reports that she has had decreased appetite and decreased level of activity.  Patient notes having UTI type symptoms with frequency, urgency and dysuria.  Patient is also noted having dizziness.  Patient reports that on the day of admission, she is also had fevers and chills.  No chest pain, no shortness of breath, no URI type symptoms, no lower extremity swelling, no confusion, no polyuria.      Personal History     Past Medical History:  Hypertension    Past Surgical  History:  History reviewed. No pertinent surgical history.     Family History:   Intrauterine pregnancy    Social History:   Social History     Socioeconomic History   • Marital status:         Home Medications:  Prior to Admission medications    Medication Sig Start Date End Date Taking? Authorizing Provider   desvenlafaxine (PRISTIQ) 50 MG 24 hr tablet Take 50 mg by mouth Daily.    Jenniffer Ivory MD   folic acid (FOLVITE) 1 MG tablet Take 1 mg by mouth Daily.    Jenniffer Ivory MD   Norethin-Eth Estrad-Fe Biphas (LO LOESTRIN FE PO) Take  by mouth.    Jenniffer Ivory MD   valsartan (DIOVAN) 80 MG tablet Take 80 mg by mouth Daily.    Jenniffer Ivory MD   vitamin B-12 (CYANOCOBALAMIN) 1000 MCG tablet Take 1,000 mcg by mouth Daily.    Jenniffer Ivory MD   vitamin D3 125 MCG (5000 UT) capsule capsule Take 5,000 Units by mouth Daily.    Jenniffer Ivory MD        Allergies:  No Known Allergies    Review of Systems  Review of Systems   Constitutional: Positive for activity change, appetite change, chills, fatigue and fever.   HENT: Negative for congestion, ear discharge, ear pain, postnasal drip, rhinorrhea, sinus pressure, sinus pain, sneezing and sore throat.    Eyes: Negative for itching.   Respiratory: Negative for cough, chest tightness, shortness of breath and wheezing.    Cardiovascular: Negative for chest pain and leg swelling.   Gastrointestinal: Positive for abdominal pain, constipation, diarrhea, nausea and vomiting.   Endocrine: Negative for polydipsia and polyuria.   Genitourinary: Positive for dysuria, frequency and urgency. Negative for decreased urine volume, difficulty urinating and flank pain.   Musculoskeletal: Positive for back pain. Negative for arthralgias, gait problem, myalgias and neck pain.   Neurological: Positive for dizziness. Negative for headaches.   Psychiatric/Behavioral: Negative for confusion.           Objective   Objective     Vitals:    Temp:  [97.4 °F (36.3 °C)] 97.4 °F (36.3 °C)  Heart Rate:  [78-96] 90  Resp:  [20] 20  BP: (135-147)/(78-86) 137/79    Physical Exam   Physical Exam  Constitutional:       General: She is in acute distress.      Appearance: Normal appearance. She is obese. She is ill-appearing.   HENT:      Head: Normocephalic and atraumatic.      Right Ear: External ear normal.      Left Ear: External ear normal.      Nose: Nose normal.      Mouth/Throat:      Pharynx: Oropharynx is clear.   Eyes:      Extraocular Movements: Extraocular movements intact.      Pupils: Pupils are equal, round, and reactive to light.   Cardiovascular:      Rate and Rhythm: Normal rate and regular rhythm.      Pulses: Normal pulses.      Heart sounds: Normal heart sounds. No murmur heard.    No friction rub. No gallop.   Pulmonary:      Effort: Pulmonary effort is normal. No respiratory distress.      Breath sounds: Normal breath sounds. No wheezing, rhonchi or rales.   Abdominal:      General: Bowel sounds are normal. There is no distension.      Tenderness: There is abdominal tenderness.      Comments: Epigastric tenderness   Musculoskeletal:         General: No swelling. Normal range of motion.      Cervical back: Normal range of motion and neck supple.   Skin:     General: Skin is warm.   Neurological:      General: No focal deficit present.      Mental Status: She is alert.          Result Review    Result Review:  Glucose   Date Value Ref Range Status   07/05/2022 360 (H) 65 - 99 mg/dL Final     BUN   Date Value Ref Range Status   07/05/2022 8 6 - 20 mg/dL Final     Creatinine   Date Value Ref Range Status   07/05/2022 0.85 0.57 - 1.00 mg/dL Final     Sodium   Date Value Ref Range Status   07/05/2022 132 (L) 136 - 145 mmol/L Final     Potassium   Date Value Ref Range Status   07/05/2022 4.9 3.5 - 5.2 mmol/L Final     Chloride   Date Value Ref Range Status   07/05/2022 95 (L) 98 - 107 mmol/L Final     CO2   Date Value Ref Range Status    07/05/2022 24.5 22.0 - 29.0 mmol/L Final     Calcium   Date Value Ref Range Status   07/05/2022 9.9 8.6 - 10.5 mg/dL Final     Total Protein   Date Value Ref Range Status   07/05/2022 8.1 6.0 - 8.5 g/dL Final     Albumin   Date Value Ref Range Status   07/05/2022 4.60 3.50 - 5.20 g/dL Final     ALT (SGPT)   Date Value Ref Range Status   07/05/2022 109 (H) 1 - 33 U/L Final     AST (SGOT)   Date Value Ref Range Status   07/05/2022 131 (H) 1 - 32 U/L Final     Alkaline Phosphatase   Date Value Ref Range Status   07/05/2022 273 (H) 39 - 117 U/L Final     Total Bilirubin   Date Value Ref Range Status   07/05/2022 2.7 (H) 0.0 - 1.2 mg/dL Final     BUN/Creatinine Ratio   Date Value Ref Range Status   07/05/2022 9.4 7.0 - 25.0 Final     Anion Gap   Date Value Ref Range Status   07/05/2022 12.5 5.0 - 15.0 mmol/L Final      WBC   Date Value Ref Range Status   07/05/2022 11.07 (H) 3.40 - 10.80 10*3/mm3 Final     RBC   Date Value Ref Range Status   07/05/2022 5.47 (H) 3.77 - 5.28 10*6/mm3 Final     Hemoglobin   Date Value Ref Range Status   07/05/2022 15.7 12.0 - 15.9 g/dL Final     Hematocrit   Date Value Ref Range Status   07/05/2022 47.9 (H) 34.0 - 46.6 % Final     MCV   Date Value Ref Range Status   07/05/2022 87.6 79.0 - 97.0 fL Final     MCH   Date Value Ref Range Status   07/05/2022 28.7 26.6 - 33.0 pg Final     MCHC   Date Value Ref Range Status   07/05/2022 32.8 31.5 - 35.7 g/dL Final     RDW   Date Value Ref Range Status   07/05/2022 13.4 12.3 - 15.4 % Final     RDW-SD   Date Value Ref Range Status   07/05/2022 43.0 37.0 - 54.0 fl Final     MPV   Date Value Ref Range Status   07/05/2022 10.7 6.0 - 12.0 fL Final     Platelets   Date Value Ref Range Status   07/05/2022 303 140 - 450 10*3/mm3 Final     Neutrophil %   Date Value Ref Range Status   07/05/2022 89.3 (H) 42.7 - 76.0 % Final     Lymphocyte %   Date Value Ref Range Status   07/05/2022 7.8 (L) 19.6 - 45.3 % Final     Monocyte %   Date Value Ref Range Status    07/05/2022 2.3 (L) 5.0 - 12.0 % Final     Eosinophil %   Date Value Ref Range Status   07/05/2022 0.0 (L) 0.3 - 6.2 % Final     Basophil %   Date Value Ref Range Status   07/05/2022 0.3 0.0 - 1.5 % Final     Immature Grans %   Date Value Ref Range Status   07/05/2022 0.3 0.0 - 0.5 % Final     Neutrophils, Absolute   Date Value Ref Range Status   07/05/2022 9.89 (H) 1.70 - 7.00 10*3/mm3 Final     Lymphocytes, Absolute   Date Value Ref Range Status   07/05/2022 0.86 0.70 - 3.10 10*3/mm3 Final     Monocytes, Absolute   Date Value Ref Range Status   07/05/2022 0.26 0.10 - 0.90 10*3/mm3 Final     Eosinophils, Absolute   Date Value Ref Range Status   07/05/2022 0.00 0.00 - 0.40 10*3/mm3 Final     Basophils, Absolute   Date Value Ref Range Status   07/05/2022 0.03 0.00 - 0.20 10*3/mm3 Final     Immature Grans, Absolute   Date Value Ref Range Status   07/05/2022 0.03 0.00 - 0.05 10*3/mm3 Final     nRBC   Date Value Ref Range Status   07/05/2022 0.0 0.0 - 0.2 /100 WBC Final      UA    Urinalysis 7/5/22   Specific [place], UA >1.030 (A)   Ketones, UA 80 mg/dL (3+) (A)   Blood, UA Negative   Leukocytes, UA Negative   Nitrite, UA Negative   (A) Abnormal value               Imaging:  CT Abdomen Pelvis With Contrast    Result Date: 7/6/2022  PROCEDURE: CT ABDOMEN PELVIS W CONTRAST  COMPARISON: 7/5/2022.  INDICATIONS: PAIN ACROSS UPPER ABDOMEN; NAUSEA & VOMITING.  TECHNIQUE: After obtaining the patient's consent, CT images were created with non-ionic intravenous contrast material.   PROTOCOL:   Standard imaging protocol performed    RADIATION:   DLP: 2,431.8mGy*cm   Automated exposure control was utilized to minimize radiation dose. CONTRAST: 100 mL Isovue 370 I.V.  FINDINGS: Gallstones are suspected with possible acute cholecystitis.  Age-indeterminate, but probably acute to subacute, inflammatory change surrounds the gallbladder.  There is diffuse hepatic steatosis with hepatomegaly.  The maximum craniocaudal dimension of the  right lobe of the liver is 25.5 cm.  No splenomegaly is suspected.  The maximum craniocaudal dimension of the spleen is 12.9 cm.  No biliary ductal dilatation is seen.  The proximal common bile duct maximum diameter is about 6 mm.  Inflammatory changes are seen about the 2nd portion of the duodenum and the head of the pancreas.  Acute to subacute pancreatitis and/or duodenitis cannot be excluded.  Probably no acute gastritis is seen.  No definite CT evidence of choledocholithiasis.  Hyperdensities are seen within the gastric and colonic lumen and may be related to ingestion of hyperdense medication.  No history of oral contrast administration for the current exam is provided.  No acute infiltrate is seen in the partially imaged lung bases.  There may be mild subsegmental atelectasis.  No pleural effusion.  No cardiac enlargement.  No pericardial effusion.  A tiny hiatal hernia cannot be excluded.  No adrenal mass is seen.  No renal/ureteral stones or hydronephrosis or obstructive uropathy.  No acute pyelonephritis.  No acute colitis or diverticulitis.  No acute appendicitis.  There are scattered colonic diverticula.  There is a fat-containing umbilical hernia.  It does not contain bowel.  There is 5.2 cm left adnexal cyst, as seen on image 100 of series 201.  It is nonspecific.  It is probably a benign functional ovarian cyst.  Consider imaging follow-up if clinically warranted.  It is suspected that the patient has undergone bilateral tubal ligation.  No suspicious uterine or right adnexal mass.  No ascites.  Degenerative changes are seen throughout the imaged spine.  There may be diffuse idiopathic skeletal hyperostosis (DISH).  Degenerative changes involve the hip joints and the bilateral sacroiliac joints.  No acute fracture.  No aggressive osseous lesion is suggested.           1. CT findings suggest acute to subacute cholecystitis.  No biliary ductal dilatation.  No choledocholithiasis is seen.  No  pneumobilia. 2. Age-indeterminate, but probably acute to subacute, duodenitis and/or pancreatitis may be present, probably secondary to the cholecystitis.  3. No pneumoperitoneum or pneumatosis.  No acute appendicitis.  No acute colitis or diverticulitis.  No mechanical bowel obstruction.  4. There is an incidental 5.2 cm left adnexal cyst.  It may represent a normal functional ovarian cyst.  Consider imaging follow-up if clinically warranted.  The patient has undergone bilateral tubal ligation.  5. There is diffuse hepatic steatosis with hepatomegaly.  No splenomegaly.  6. Otherwise, no acute findings are seen.  7. Please see above comments for further detail.  1.   COMMENT:  Part of this note is an electronic transcription of spoken language to printed text. The electronic translation/transcription may permit erroneous, or at times, nonsensical (or even sensical) words or phrases to be inadvertently transcribed or omitted; this  has reviewed the note for such errors (as well as additional errors); however, some may still exist.  MIRTA SINGH JR, MD       Electronically Signed and Approved By: MIRTA SINGH JR, MD on 7/06/2022 at 3:07              US Gallbladder    Result Date: 7/6/2022  PROCEDURE: US GALLBLADDER  COMPARISON: 6/21/2022.  INDICATIONS: hx of gallstones; worsening vomiting & unspecified abdomen pain  TECHNIQUE: A limited abdominal ultrasound examination of the right upper quadrant was performed, tailored in order to evaluate the gallbladder and the biliary tree.   FINDINGS: Two-dimensional grayscale images as well as color and spectral Doppler analysis were performed. The patient was fasting as per protocol.  The gallbladder is mildly distended (8.4 x 4 cm on image 29/62) and contains stones. There is minimal if any thickening of the gallbladder wall. No pericholecystic fluid is seen.  The wall thickness is 2 mm.  The common bile duct diameter is 3.6 mm. No choledocholithiasis is seen.  The entire extrahepatic biliary tree is not visualized. No intrahepatic biliary ductal dilatation is seen. Doppler interrogation of the hepatic vasculature reveals patent vessels with normal blood flow direction.  There is diffuse hepatic steatosis with hepatomegaly.  Otherwise, no definite focal abnormality of the liver.  The visualized portions of the pancreas and right kidney are unremarkable. The sqsx-cy-qxwe length of the right kidney is 12.3 cm. No right hydronephrosis is seen. The craniocaudal dimension of the right lobe of the liver is 20 cm.  The left kidney, spleen, inferior vena cava, and abdominal aorta were not evaluated.  A positive sonographic James's sign was reported.       Gallstones are seen without definite acute cholecystitis by ultrasound examination.  Please correlate clinically, especially with pertinent lab values.  No biliary ductal dilatation is seen.  A positive sonographic James's sign was reported.  There is diffuse hepatic steatosis with hepatomegaly.  The other findings are as discussed above.     COMMENT:  Part of this note is an electronic transcription of spoken language to printed text. The electronic translation/transcription may permit erroneous, or at times, nonsensical (or even sensical) words or phrases to be inadvertently transcribed or omitted; this  has reviewed the note for such errors (as well as additional errors); however, some may still exist.  MIRTA SINGH JR, MD       Electronically Signed and Approved By: MIRTA SINGH JR, MD on 7/06/2022 at 0:37              US Gallbladder    Result Date: 6/21/2022  PROCEDURE: US GALLBLADDER  COMPARISON:  INDICATIONS: upper right quadrant pain  TECHNIQUE: A limited ultrasound examination of the right upper quadrant was performed.   FINDINGS: The pancreas is sonographically unremarkable.   The liver is more hyperechoic than right renal cortex.  The liver measures 19.8  cm  in length.   No focal masses are seen.   Hepatic and portal veins are patent with normal directional flow. The gallbladder demonstrates multiple gallstones.  The gallbladder wall is difficult to measure but measures approximately 3 millimeters.  Negative sonographic James sign  Common bile duct is 5 mm in size.   The right kidney measures 12.6 cm in length and is sonographically unremarkable.   No fluid collections are seen in the right upper quadrant.          Multiple gallstones are seen.  Gallbladder wall is difficult to measure due to numerous gallstones shadowing but probably measures about 3 millimeters which is borderline thickened.  There is no pericholecystic fluid and there is a negative sonographic James sign.  Hepatic steatosis.  Exam limited by bowel gas and underpenetration.     TIRSO VAZQUEZ MD       Electronically Signed and Approved By: TIRSO VAZQUEZ MD on 6/21/2022 at 9:03                  Assessment & Plan   Assessment / Plan     Active Hospital Problems    Diagnosis  POA   • Calculus of gallbladder with acute cholecystitis and obstruction [K80.01]  Yes     Priority: High   • Elevated LFTs [R79.89]  Yes     Priority: High   • Acute pancreatitis [K85.90]  Yes     Priority: High   • Leukocytosis [D72.829]  Yes        Assessment/Plan:   1. Acute cholecystitis- patient reports having right upper quadrant pain for the past few months.  Patient has had work-up done as an outpatient and was found to have cholelithiasis.  Patient states that she was supposed to follow-up with general surgery for removal of her gallbladder.  Patient states that this has not been able to be coordinated secondary to issues with the scheduling.  Patient found to have elevated LFTs.  Patient's imaging shows multiple gallstones.  Patient given Zosyn in the ED.  Patient will be continued on Zosyn.  We will get a surgical consult for possible cholecystectomy to be done during this hospitalization.  2. Acute pancreatitis-patient notes having epigastric pain  radiating to her back.  Patient found to have a lipase greater than 3000.  Patient most likely with acute pancreatitis secondary to gallstones.  Patient will be made NPO.  We will give patient adequate IV fluid hydration.  Patient will also be given pain control.  We will get an MRCP in order to assess if there are any stones retained and the ducts.  GI has been consulted.  3. Elevated LFTs-patient most likely with elevated LFTs secondary to gallstones.  Patient's LFTs will be monitored throughout hospitalization.  4. Leukocytosis-patient with leukocytosis secondary to acute cholecystitis and acute pancreatitis.  Patient currently on Zosyn.    5. Primary hypertension-patient noted to be on blood pressure medications.  Patient currently NPO.  We will hold off on giving patient her blood pressure medications for now.  If needed, patient will be given hydralazine as needed for systolic blood pressures greater than 160.      DVT prophylaxis:  Medical DVT prophylaxis orders are present.    CODE STATUS:    Level Of Support Discussed With: Patient  Code Status (Patient has no pulse and is not breathing): No CPR (Do Not Attempt to Resuscitate)  Medical Interventions (Patient has pulse or is breathing): Full Support      Admission Status:  I believe this patient meets inpatient status.    Electronically signed by Marisol Blue MD, 07/06/22, 4:22 AM EDT.

## 2022-07-06 NOTE — H&P (VIEW-ONLY)
[facility]   Consult    Patient Name: Wilma Colmenares  : 1970  MRN: [id number]  Primary Care Physician:  Oumou Dacosta APRN  Date of admission: 2022    Subjective   Subjective     Chief Complaint: Abdominal pain    HPI: Wilma Colmenares is a 52 y.o. female who was admitted earlier this morning with acute onset abdominal pain.  She has known gallstones and I believe have been scheduled to see Dr. Juarez later this week in the office.  She developed worsening abdominal pain and came to the emergency room last night.  On lab test she was noted to have a lipase in excess of 3000 and had some elevated liver function test.  She has had a MRI and MRCP that showed evidence of acute pancreatitis and gallstones but no evidence of common bile duct stones.  She is still having a fair amount of pain currently.    Review of Systems   Respiratory: Negative for shortness of breath.    Cardiovascular: Negative for chest pain.   Gastrointestinal: Positive for abdominal pain.       Personal History     Past Medical History:   Diagnosis Date   • Arthritis    • Diabetes mellitus (HCC)    • Elevated cholesterol    • Hypertension    • Sleep apnea        Past Surgical History:   Procedure Laterality Date   • HERNIA REPAIR         Family History: family history is not on file. Otherwise pertinent FHx was reviewed and not pertinent to current issue.    Social History:  reports that she has never smoked. She does not have any smokeless tobacco history on file. She reports current alcohol use. She reports that she does not use drugs.    Home Medications:  Norethin-Eth Estrad-Fe Biphas, desvenlafaxine, folic acid, glipizide, metFORMIN, valsartan, vitamin B-12, and vitamin D3    Allergies:  Allergies   Allergen Reactions   • Demerol [Meperidine] Nausea And Vomiting       Objective    Objective     Vitals:   Temp:  [97.4 °F (36.3 °C)-98 °F (36.7 °C)] 97.9 °F (36.6 °C)  Heart Rate:  [78-97] 97  Resp:  [18-20] 18  BP:  (119-147)/(70-86) 119/70    Physical Exam  Constitutional:       General: She is not in acute distress.     Appearance: She is obese. She is not ill-appearing or toxic-appearing.   HENT:      Head: Normocephalic and atraumatic.   Cardiovascular:      Rate and Rhythm: Normal rate.   Pulmonary:      Effort: Pulmonary effort is normal.   Abdominal:      Palpations: Abdomen is soft.      Tenderness: There is abdominal tenderness in the epigastric area.   Musculoskeletal:         General: Normal range of motion.      Cervical back: Normal range of motion.   Skin:     General: Skin is warm.   Neurological:      General: No focal deficit present.      Mental Status: She is oriented to person, place, and time.   Psychiatric:         Mood and Affect: Mood normal.         Behavior: Behavior normal.          Result Review    Result Review:  I have personally reviewed the results from the time of this admission to 7/6/2022 16:40 EDT and agree with these findings:  [x]  Laboratory  []  Microbiology  []  Radiology  []  EKG/Telemetry   []  Cardiology/Vascular   []  Pathology  []  Old records  []  Other:  Most notable findings include:     Assessment & Plan   Assessment / Plan     Brief Patient Summary:  Wilma Colmenares is a 52 y.o. female who has gallstone pancreatitis.  At this point she does not look terribly ill.    Active Hospital Problems:  Active Hospital Problems    Diagnosis    • Calculus of gallbladder with acute cholecystitis and obstruction    • Elevated LFTs    • Acute pancreatitis    • Leukocytosis        Plan: I agree with the plan for inpatient admission and IV fluid resuscitation and bowel rest.  We will follow along and will plan on performing a laparoscopic cholecystectomy once her pancreatitis is better.    DVT prophylaxis:  Medical DVT prophylaxis orders are present.    CODE STATUS:    Code Status (Patient has no pulse and is not breathing): CPR (Attempt to Resuscitate)  Medical Interventions (Patient has  pulse or is breathing): Full Support      Admission Status:  I believe this patient meets inpatient status.    Electronically signed by Dandy Bonilla MD, 07/06/22, 4:40 PM EDT.

## 2022-07-06 NOTE — CONSULTS
[facility], [place], operated by [facility]  Initial Inpatient Consult Note    Referring Provider: Dr. Gonzales    Reason for Consultation: gallstone pancreatitis    Subjective     History of present illness:    52 y.o. female with history of DM, HTN, HLD, and BRANDON who presents with c/o abdominal pain.  Pt reports epigastric pain for last few months.  Symptoms yesterday were severe, radiated to back, no aggravating/alleviating factors.  Reports associated nausea, vomiting.  She reports subjective fever while vomiting but has since resolved.  Labs on presentation show WBC 11.0, Hgb 15.7, Plt 303, , , alk phos 273, TB 2.7, lipase >3000.  RUQ US showed gallstones, diffuse hepatic steatosis with hepatomegaly.    Past Medical History:  Past Medical History:   Diagnosis Date   • Arthritis    • Diabetes mellitus (HCC)    • Elevated cholesterol    • Hypertension    • Sleep apnea      Past Surgical History:  Past Surgical History:   Procedure Laterality Date   • HERNIA REPAIR        Social History:   Social History     Tobacco Use   • Smoking status: Never Smoker   • Smokeless tobacco: Not on file   Substance Use Topics   • Alcohol use: Yes      Family History:  History reviewed. No pertinent family history.    Home Meds:  Medications Prior to Admission   Medication Sig Dispense Refill Last Dose   • desvenlafaxine (PRISTIQ) 50 MG 24 hr tablet Take 50 mg by mouth Daily.      • folic acid (FOLVITE) 1 MG tablet Take 1 mg by mouth Daily.      • Norethin-Eth Estrad-Fe Biphas (LO LOESTRIN FE PO) Take  by mouth.      • valsartan (DIOVAN) 80 MG tablet Take 80 mg by mouth Daily.      • vitamin B-12 (CYANOCOBALAMIN) 1000 MCG tablet Take 1,000 mcg by mouth Daily.      • vitamin D3 125 MCG (5000 UT) capsule capsule Take 5,000 Units by mouth Daily.        Current Meds:   diphenhydrAMINE, , ,   droperidol, , ,   enoxaparin, 40 mg, Subcutaneous, BID  HYDROmorphone, , ,   HYDROmorphone, , ,   insulin lispro, 0-9 Units, Subcutaneous, TID  AC  ondansetron, , ,   piperacillin-tazobactam, 4.5 g, Intravenous, Q8H  piperacillin-tazobactam, , ,   saccharomyces boulardii, 250 mg, Oral, BID  senna-docusate sodium, 2 tablet, Oral, BID  sodium chloride, 10 mL, Intravenous, Q12H  sodium chloride, , ,   sodium chloride, , ,   valsartan, 80 mg, Oral, Daily  venlafaxine, 50 mg, Oral, Daily      Allergies:  Allergies   Allergen Reactions   • Demerol [Meperidine] Nausea And Vomiting     Review of Systems  Pertinent items are noted in HPI, all other systems reviewed and negative     Objective     Vital Signs  Temp:  [97.4 °F (36.3 °C)-98 °F (36.7 °C)] 97.7 °F (36.5 °C)  Heart Rate:  [78-96] 94  Resp:  [18-20] 18  BP: (122-147)/(78-86) 122/79  Physical Exam:  General Appearance:    Alert, cooperative, in no acute distress   Head:    Normocephalic, without obvious abnormality, atraumatic   Eyes:          conjunctivae and sclerae normal, no icterus   Throat:   no thrush, oral mucosa moist   Neck:   Supple, no adenopathy   Lungs:     Breathing unlabored    Heart:    No chest tenderness    Chest Wall:    No abnormalities observed   Abdomen:     Soft, nondistended, diffuse TTP   Extremities:   no edema, no redness   Skin:   No bruising or rash   Psychiatric:  normal mood and insight     Results Review:   I reviewed the patient's new clinical results.    Results from last 7 days   Lab Units 07/05/22 1936   WBC 10*3/mm3 11.07*   HEMOGLOBIN g/dL 15.7   HEMATOCRIT % 47.9*   PLATELETS 10*3/mm3 303     Results from last 7 days   Lab Units 07/05/22 1936   SODIUM mmol/L 132*   POTASSIUM mmol/L 4.9   CHLORIDE mmol/L 95*   CO2 mmol/L 24.5   BUN mg/dL 8   CREATININE mg/dL 0.85   CALCIUM mg/dL 9.9   BILIRUBIN mg/dL 2.7*   ALK PHOS U/L 273*   ALT (SGPT) U/L 109*   AST (SGOT) U/L 131*   GLUCOSE mg/dL 360*         Lab Results   Lab Value Date/Time    LIPASE >3,000 (H) 07/05/2022 1936       Radiology:  CT Abdomen Pelvis With Contrast   Final Result       1. CT findings suggest acute to  subacute cholecystitis.  No biliary ductal dilatation.  No    choledocholithiasis is seen.  No pneumobilia.   2. Age-indeterminate, but probably acute to subacute, duodenitis and/or pancreatitis may be    present, probably secondary to the cholecystitis.     3. No pneumoperitoneum or pneumatosis.  No acute appendicitis.  No acute colitis or diverticulitis.     No mechanical bowel obstruction.     4. There is an incidental 5.2 cm left adnexal cyst.  It may represent a normal functional ovarian    cyst.  Consider imaging follow-up if clinically warranted.  The patient has undergone bilateral    tubal ligation.     5. There is diffuse hepatic steatosis with hepatomegaly.  No splenomegaly.     6. Otherwise, no acute findings are seen.     7. Please see above comments for further detail.     1.            COMMENT:  Part of this note is an electronic transcription of spoken language to printed text. The    electronic translation/transcription may permit erroneous, or at times, nonsensical (or even    sensical) words or phrases to be inadvertently transcribed or omitted; this  has    reviewed the note for such errors (as well as additional errors); however, some may still exist.       MIRTA SINGH JR, MD          Electronically Signed and Approved By: MIRTA SINGH JR, MD on 7/06/2022 at 3:07                               US Gallbladder   Final Result    Gallstones are seen without definite acute cholecystitis by ultrasound examination.     Please correlate clinically, especially with pertinent lab values.  No biliary ductal dilatation is    seen.  A positive sonographic James's sign was reported.  There is diffuse hepatic steatosis with    hepatomegaly.  The other findings are as discussed above.                 COMMENT:  Part of this note is an electronic transcription of spoken language to printed text. The    electronic translation/transcription may permit erroneous, or at times, nonsensical (or even     sensical) words or phrases to be inadvertently transcribed or omitted; this  has    reviewed the note for such errors (as well as additional errors); however, some may still exist.       MIRTA SINGH JR, MD          Electronically Signed and Approved By: MIRTA SINGH JR, MD on 7/06/2022 at 0:37                               MRI abdomen wo contrast mrcp    (Results Pending)       Assessment & Plan   Patient Active Problem List   Diagnosis   • Calculus of gallbladder with acute cholecystitis and obstruction   • Elevated LFTs   • Acute pancreatitis   • Leukocytosis       Assessment:  1. Acute gallstone pancreatitis  2. Cholelithiasis   3. Elevated liver enzymes    Plan:  · MRCP negative for choledocholithiasis; would not recommend ERCP at this time  · Continue pain control, IV fluids, bowel rest for pancreatitis  · Timing of cholecystectomy per surgery      I discussed the patients findings and my recommendations with patient and primary care team.    Deonna Ahn MD

## 2022-07-06 NOTE — PROGRESS NOTES
[facility]   Hospitalist Progress Note    Date of admission: 7/5/2022  Patient Name: Wilma Colmenares  1970  Date: 7/6/2022      Subjective     Chief Complaint   Patient presents with   • Abdominal Pain       Summary: 52-year-old female with history of abdominal pain and cholelithiasis previously evaluated by surgery with plans for outpatient cholecystitis who presents with progressively worsening right upper quadrant abdominal pain over the past week.  She notes she has had issues with this intermittently over the past few months.  Patient found to have acute cholecystitis and acute pancreatitis suspected secondary to gallstones.  MRCP did not reveal choledocholithiasis and ERCP was not required initially.  GI assisted with evaluation.  Surgery consulted for evaluation for cholecystectomy and acute cholecystitis    Interval Followup: Pain improving, intermittently using as needed pain medications.  Discussed testing and monitoring and patient in agreement.    Review of Systems  No chest pain or palpitations  No fevers or chills    Objective     Vitals:   Temp:  [97.4 °F (36.3 °C)-98 °F (36.7 °C)] 97.7 °F (36.5 °C)  Heart Rate:  [78-96] 94  Resp:  [18-20] 18  BP: (122-147)/(78-86) 122/79    Physical Exam  Gen: awake, resting in bed, conversant  HENT: NCAT, mmm  Resp: CTA B no wheezes rhonchi or rales  CV: RRR, no LE pitting edema  GI: Abdomen soft,  mild right upper quadrant tenderness without guarding or rigidity, ND, +BS  Psych: appropriate mood and affect, aox3  Skin: warm, dry    Result Review:  Vital signs, labs and any relevant imaging reviewed.        Assessment / Plan     Assessment/Plan:  Acute pancreatitis  Acute cholecystitis, calculus with obstruction  Elevated LFTs in setting of above  Severe morbid obesity BMI 45  Type 2 diabetes mellitus only on metformin as outpatient  Incidental 5.2 cm left adnexal cyst on imaging, outpatient follow-up for monitoring  Hepatic steatosis and hepatomegaly  suspect in setting of fatty liver disease    -IV Zosyn and Florastor for acute cholecystitis  - General surgery/Dr. Bonilla consulted appreciate assistance -timing of cholecystectomy as per surgery but wishing for patient to have time and further management of pancreatitis before intervening.  - MRCP for pancreatitis, no choledocholithiasis, no acute ERCP yesterday per discussion with Dr. Ahn   - IV fluids with normal saline monitor electrolytes and volume status.  - IV and p.o. opiate, bowel regimen  -Notable hyperglycemia, check A1c, sliding scale insulin, titrate further, diabetic educator consult.  Suspect suboptimally treated as outpatient.  Notable glucosuria and trace protein.  -Continue home valsartan monitor blood pressure  -Check preop COVID  - Continue home Effexor  -Monitor LFTs, white count  -follow up am labs  -rest per orders    DVT prophylaxis:  Medical DVT prophylaxis orders are present.    Level Of Support Discussed With: Patient  Code Status (Patient has no pulse and is not breathing): No CPR (Do Not Attempt to Resuscitate)  Medical Interventions (Patient has pulse or is breathing): Full Support        CBC    CBC 7/5/22   WBC 11.07 (A)   RBC 5.47 (A)   Hemoglobin 15.7   Hematocrit 47.9 (A)   MCV 87.6   MCH 28.7   MCHC 32.8   RDW 13.4   Platelets 303   (A) Abnormal value              CMP    CMP 7/5/22   Glucose 360 (A)   BUN 8   Creatinine 0.85   Sodium 132 (A)   Potassium 4.9   Chloride 95 (A)   Calcium 9.9   Albumin 4.60   Total Bilirubin 2.7 (A)   Alkaline Phosphatase 273 (A)   AST (SGOT) 131 (A)   ALT (SGPT) 109 (A)   (A) Abnormal value              Dispo: pending stability in clinical condition and appropriate discharge location.

## 2022-07-07 LAB
ALBUMIN SERPL-MCNC: 3.5 G/DL (ref 3.5–5.2)
ALBUMIN/GLOB SERPL: 1.2 G/DL
ALP SERPL-CCNC: 182 U/L (ref 39–117)
ALT SERPL W P-5'-P-CCNC: 59 U/L (ref 1–33)
ANION GAP SERPL CALCULATED.3IONS-SCNC: 8.1 MMOL/L (ref 5–15)
AST SERPL-CCNC: 38 U/L (ref 1–32)
BACTERIA UR QL AUTO: ABNORMAL /HPF
BASOPHILS # BLD AUTO: 0.04 10*3/MM3 (ref 0–0.2)
BASOPHILS NFR BLD AUTO: 0.2 % (ref 0–1.5)
BILIRUB SERPL-MCNC: 1.5 MG/DL (ref 0–1.2)
BILIRUB UR QL STRIP: NEGATIVE
BUN SERPL-MCNC: 7 MG/DL (ref 6–20)
BUN/CREAT SERPL: 9.7 (ref 7–25)
CALCIUM SPEC-SCNC: 8.7 MG/DL (ref 8.6–10.5)
CHLORIDE SERPL-SCNC: 100 MMOL/L (ref 98–107)
CLARITY UR: ABNORMAL
CO2 SERPL-SCNC: 22.9 MMOL/L (ref 22–29)
COLOR UR: ABNORMAL
CREAT SERPL-MCNC: 0.72 MG/DL (ref 0.57–1)
DEPRECATED RDW RBC AUTO: 45.5 FL (ref 37–54)
EGFRCR SERPLBLD CKD-EPI 2021: 102 ML/MIN/1.73
EOSINOPHIL # BLD AUTO: 0.04 10*3/MM3 (ref 0–0.4)
EOSINOPHIL NFR BLD AUTO: 0.2 % (ref 0.3–6.2)
ERYTHROCYTE [DISTWIDTH] IN BLOOD BY AUTOMATED COUNT: 13.7 % (ref 12.3–15.4)
GLOBULIN UR ELPH-MCNC: 3 GM/DL
GLUCOSE BLDC GLUCOMTR-MCNC: 129 MG/DL (ref 70–99)
GLUCOSE BLDC GLUCOMTR-MCNC: 151 MG/DL (ref 70–99)
GLUCOSE BLDC GLUCOMTR-MCNC: 173 MG/DL (ref 70–99)
GLUCOSE BLDC GLUCOMTR-MCNC: 176 MG/DL (ref 70–99)
GLUCOSE SERPL-MCNC: 184 MG/DL (ref 65–99)
GLUCOSE UR STRIP-MCNC: ABNORMAL MG/DL
HCT VFR BLD AUTO: 41.3 % (ref 34–46.6)
HGB BLD-MCNC: 13 G/DL (ref 12–15.9)
HGB UR QL STRIP.AUTO: NEGATIVE
HYALINE CASTS UR QL AUTO: ABNORMAL /LPF
IMM GRANULOCYTES # BLD AUTO: 0.09 10*3/MM3 (ref 0–0.05)
IMM GRANULOCYTES NFR BLD AUTO: 0.5 % (ref 0–0.5)
KETONES UR QL STRIP: ABNORMAL
LEUKOCYTE ESTERASE UR QL STRIP.AUTO: ABNORMAL
LIPASE SERPL-CCNC: 171 U/L (ref 13–60)
LYMPHOCYTES # BLD AUTO: 1.69 10*3/MM3 (ref 0.7–3.1)
LYMPHOCYTES NFR BLD AUTO: 10 % (ref 19.6–45.3)
MAGNESIUM SERPL-MCNC: 2 MG/DL (ref 1.6–2.6)
MCH RBC QN AUTO: 28.5 PG (ref 26.6–33)
MCHC RBC AUTO-ENTMCNC: 31.5 G/DL (ref 31.5–35.7)
MCV RBC AUTO: 90.6 FL (ref 79–97)
MONOCYTES # BLD AUTO: 1.22 10*3/MM3 (ref 0.1–0.9)
MONOCYTES NFR BLD AUTO: 7.2 % (ref 5–12)
NEUTROPHILS NFR BLD AUTO: 13.87 10*3/MM3 (ref 1.7–7)
NEUTROPHILS NFR BLD AUTO: 81.9 % (ref 42.7–76)
NITRITE UR QL STRIP: NEGATIVE
NRBC BLD AUTO-RTO: 0 /100 WBC (ref 0–0.2)
PH UR STRIP.AUTO: 5.5 [PH] (ref 5–8)
PHOSPHATE SERPL-MCNC: 2.5 MG/DL (ref 2.5–4.5)
PLATELET # BLD AUTO: 227 10*3/MM3 (ref 140–450)
PMV BLD AUTO: 10.7 FL (ref 6–12)
POTASSIUM SERPL-SCNC: 4.5 MMOL/L (ref 3.5–5.2)
PROT SERPL-MCNC: 6.5 G/DL (ref 6–8.5)
PROT UR QL STRIP: ABNORMAL
RBC # BLD AUTO: 4.56 10*6/MM3 (ref 3.77–5.28)
RBC # UR STRIP: ABNORMAL /HPF
REF LAB TEST METHOD: ABNORMAL
SODIUM SERPL-SCNC: 131 MMOL/L (ref 136–145)
SP GR UR STRIP: >=1.03 (ref 1–1.03)
SQUAMOUS #/AREA URNS HPF: ABNORMAL /HPF
UROBILINOGEN UR QL STRIP: ABNORMAL
WBC # UR STRIP: ABNORMAL /HPF
WBC NRBC COR # BLD: 16.95 10*3/MM3 (ref 3.4–10.8)

## 2022-07-07 PROCEDURE — 99233 SBSQ HOSP IP/OBS HIGH 50: CPT | Performed by: INTERNAL MEDICINE

## 2022-07-07 PROCEDURE — 85025 COMPLETE CBC W/AUTO DIFF WBC: CPT | Performed by: INTERNAL MEDICINE

## 2022-07-07 PROCEDURE — 25010000002 ONDANSETRON PER 1 MG: Performed by: HOSPITALIST

## 2022-07-07 PROCEDURE — 87086 URINE CULTURE/COLONY COUNT: CPT | Performed by: INTERNAL MEDICINE

## 2022-07-07 PROCEDURE — 81001 URINALYSIS AUTO W/SCOPE: CPT | Performed by: INTERNAL MEDICINE

## 2022-07-07 PROCEDURE — 99232 SBSQ HOSP IP/OBS MODERATE 35: CPT | Performed by: SURGERY

## 2022-07-07 PROCEDURE — 82962 GLUCOSE BLOOD TEST: CPT

## 2022-07-07 PROCEDURE — 80053 COMPREHEN METABOLIC PANEL: CPT | Performed by: INTERNAL MEDICINE

## 2022-07-07 PROCEDURE — 63710000001 INSULIN LISPRO (HUMAN) PER 5 UNITS: Performed by: INTERNAL MEDICINE

## 2022-07-07 PROCEDURE — 83735 ASSAY OF MAGNESIUM: CPT | Performed by: INTERNAL MEDICINE

## 2022-07-07 PROCEDURE — 25010000002 HYDROMORPHONE 1 MG/ML SOLUTION: Performed by: HOSPITALIST

## 2022-07-07 PROCEDURE — 25010000002 ENOXAPARIN PER 10 MG: Performed by: HOSPITALIST

## 2022-07-07 PROCEDURE — 83690 ASSAY OF LIPASE: CPT | Performed by: SURGERY

## 2022-07-07 PROCEDURE — 25010000002 PIPERACILLIN SOD-TAZOBACTAM PER 1 G: Performed by: HOSPITALIST

## 2022-07-07 PROCEDURE — 84100 ASSAY OF PHOSPHORUS: CPT | Performed by: INTERNAL MEDICINE

## 2022-07-07 RX ORDER — SODIUM CHLORIDE 9 MG/ML
150 INJECTION, SOLUTION INTRAVENOUS CONTINUOUS
Status: ACTIVE | OUTPATIENT
Start: 2022-07-07 | End: 2022-07-08

## 2022-07-07 RX ADMIN — SENNOSIDES AND DOCUSATE SODIUM 2 TABLET: 50; 8.6 TABLET ORAL at 20:01

## 2022-07-07 RX ADMIN — HYDROMORPHONE HYDROCHLORIDE 1 MG: 1 INJECTION, SOLUTION INTRAMUSCULAR; INTRAVENOUS; SUBCUTANEOUS at 10:40

## 2022-07-07 RX ADMIN — VENLAFAXINE 50 MG: 25 TABLET ORAL at 08:00

## 2022-07-07 RX ADMIN — ONDANSETRON 4 MG: 2 INJECTION INTRAMUSCULAR; INTRAVENOUS at 07:59

## 2022-07-07 RX ADMIN — Medication 10 ML: at 20:01

## 2022-07-07 RX ADMIN — HYDROMORPHONE HYDROCHLORIDE 1 MG: 1 INJECTION, SOLUTION INTRAMUSCULAR; INTRAVENOUS; SUBCUTANEOUS at 16:33

## 2022-07-07 RX ADMIN — SODIUM CHLORIDE 150 ML/HR: 9 INJECTION, SOLUTION INTRAVENOUS at 16:33

## 2022-07-07 RX ADMIN — VALSARTAN 80 MG: 80 TABLET, FILM COATED ORAL at 08:00

## 2022-07-07 RX ADMIN — INSULIN LISPRO 2 UNITS: 100 INJECTION, SOLUTION INTRAVENOUS; SUBCUTANEOUS at 11:34

## 2022-07-07 RX ADMIN — SODIUM CHLORIDE 200 ML/HR: 9 INJECTION, SOLUTION INTRAVENOUS at 05:03

## 2022-07-07 RX ADMIN — INSULIN LISPRO 2 UNITS: 100 INJECTION, SOLUTION INTRAVENOUS; SUBCUTANEOUS at 16:32

## 2022-07-07 RX ADMIN — Medication 250 MG: at 20:01

## 2022-07-07 RX ADMIN — HYDROMORPHONE HYDROCHLORIDE 1 MG: 1 INJECTION, SOLUTION INTRAMUSCULAR; INTRAVENOUS; SUBCUTANEOUS at 21:14

## 2022-07-07 RX ADMIN — Medication 250 MG: at 08:00

## 2022-07-07 RX ADMIN — PIPERACILLIN SODIUM AND TAZOBACTAM SODIUM 4.5 G: 4; 500 INJECTION, POWDER, FOR SOLUTION INTRAVENOUS at 02:15

## 2022-07-07 RX ADMIN — PIPERACILLIN SODIUM AND TAZOBACTAM SODIUM 4.5 G: 4; 500 INJECTION, POWDER, FOR SOLUTION INTRAVENOUS at 17:20

## 2022-07-07 RX ADMIN — HYDROMORPHONE HYDROCHLORIDE 1 MG: 1 INJECTION, SOLUTION INTRAMUSCULAR; INTRAVENOUS; SUBCUTANEOUS at 02:14

## 2022-07-07 RX ADMIN — ENOXAPARIN SODIUM 40 MG: 100 INJECTION SUBCUTANEOUS at 08:00

## 2022-07-07 RX ADMIN — ONDANSETRON 4 MG: 2 INJECTION INTRAMUSCULAR; INTRAVENOUS at 20:00

## 2022-07-07 RX ADMIN — ENOXAPARIN SODIUM 40 MG: 100 INJECTION SUBCUTANEOUS at 20:00

## 2022-07-07 RX ADMIN — HYDROMORPHONE HYDROCHLORIDE 1 MG: 1 INJECTION, SOLUTION INTRAMUSCULAR; INTRAVENOUS; SUBCUTANEOUS at 18:29

## 2022-07-07 RX ADMIN — HYDROMORPHONE HYDROCHLORIDE 1 MG: 1 INJECTION, SOLUTION INTRAMUSCULAR; INTRAVENOUS; SUBCUTANEOUS at 14:06

## 2022-07-07 RX ADMIN — PIPERACILLIN SODIUM AND TAZOBACTAM SODIUM 4.5 G: 4; 500 INJECTION, POWDER, FOR SOLUTION INTRAVENOUS at 09:33

## 2022-07-07 RX ADMIN — HYDROMORPHONE HYDROCHLORIDE 1 MG: 1 INJECTION, SOLUTION INTRAMUSCULAR; INTRAVENOUS; SUBCUTANEOUS at 05:25

## 2022-07-07 RX ADMIN — SENNOSIDES AND DOCUSATE SODIUM 2 TABLET: 50; 8.6 TABLET ORAL at 08:00

## 2022-07-07 RX ADMIN — ONDANSETRON 4 MG: 2 INJECTION INTRAMUSCULAR; INTRAVENOUS at 14:06

## 2022-07-07 RX ADMIN — Medication 10 ML: at 08:00

## 2022-07-07 RX ADMIN — SODIUM CHLORIDE 200 ML/HR: 9 INJECTION, SOLUTION INTRAVENOUS at 00:03

## 2022-07-07 RX ADMIN — SODIUM CHLORIDE 150 ML/HR: 9 INJECTION, SOLUTION INTRAVENOUS at 23:09

## 2022-07-07 RX ADMIN — HYDROMORPHONE HYDROCHLORIDE 1 MG: 1 INJECTION, SOLUTION INTRAMUSCULAR; INTRAVENOUS; SUBCUTANEOUS at 07:59

## 2022-07-07 NOTE — PLAN OF CARE
Goal Outcome Evaluation:           Progress: improving  Outcome Evaluation: pt receiving PRN pain/nausea medication per MAR. no significant changes. ambulating to restroom with standby assistance. call light within reach.

## 2022-07-07 NOTE — PROGRESS NOTES
[facility]     Progress Note    Patient Name: Wilma Colmenares  : 1970  MRN: [id number]  Primary Care Physician:  Oumou Dacosta APRN  Date of admission: 2022    Subjective   Subjective     HPI:  Patient Reports feeling a little bit better.  She still having a fair amount of upper abdominal and back pain.  She still feels bloated.    Review of Systems   Gastrointestinal: Positive for abdominal distention and abdominal pain.       Objective   Objective     Vitals:   Temp:  [97.3 °F (36.3 °C)-99 °F (37.2 °C)] (P) 97.6 °F (36.4 °C)  Heart Rate:  [79-94] (P) 84  Resp:  [16-20] (P) 16  BP: (108-128)/(52-72) (P) 109/63    Physical Exam  Constitutional:       Appearance: She is obese. She is not toxic-appearing.   Cardiovascular:      Rate and Rhythm: Normal rate.   Pulmonary:      Effort: Pulmonary effort is normal.   Abdominal:      General: There is distension.      Tenderness: There is abdominal tenderness in the epigastric area.   Musculoskeletal:         General: Normal range of motion.      Cervical back: Normal range of motion.   Skin:     General: Skin is warm.   Neurological:      General: No focal deficit present.      Mental Status: She is alert and oriented to person, place, and time.   Psychiatric:         Mood and Affect: Mood normal.         Behavior: Behavior normal.          Meds:  Scheduled Meds:enoxaparin, 40 mg, Subcutaneous, BID  insulin lispro, 0-9 Units, Subcutaneous, TID AC  piperacillin-tazobactam, 4.5 g, Intravenous, Q8H  saccharomyces boulardii, 250 mg, Oral, BID  senna-docusate sodium, 2 tablet, Oral, BID  sodium chloride, 10 mL, Intravenous, Q12H  valsartan, 80 mg, Oral, Daily  venlafaxine, 50 mg, Oral, Daily      Continuous Infusions:Pharmacy to Dose Zosyn,   sodium chloride, 150 mL/hr, Last Rate: 150 mL/hr (22 1016)      PRN Meds:.•  senna-docusate sodium **AND** polyethylene glycol **AND** bisacodyl **AND** bisacodyl  •  dextrose  •  dextrose  •  glucagon (human  recombinant)  •  hydrALAZINE  •  HYDROmorphone **AND** naloxone  •  melatonin  •  ondansetron **OR** ondansetron  •  Pharmacy to Dose Zosyn  •  sodium chloride  •  sodium chloride  •  sodium chloride     Result Review    Result Review:  I have personally reviewed the results from the time of this admission to 7/7/2022 12:15 EDT and agree with these findings:  [x]  Laboratory  []  Microbiology  []  Radiology  []  EKG/Telemetry   []  Cardiology/Vascular   []  Pathology  []  Old records  []  Other:  Most notable findings include: Lipase is 171.  Alkaline phosphatase level remains elevated at 182 and total bili is stable at 1.5.    Assessment & Plan   Assessment / Plan     Brief Patient Summary:  Wilma Colmenares is a 52 y.o. female who has acute gallstone pancreatitis.  She feels a little bit better today but is  and distended in her liver function test continue to be mildly elevated.    Active Hospital Problems:  Active Hospital Problems    Diagnosis    • Calculus of gallbladder with acute cholecystitis and obstruction    • Elevated LFTs    • Acute pancreatitis    • Leukocytosis        Plan:   We will keep her on sips of water and ice chips.  We will reassess her tomorrow and see if she is ready for a laparoscopic cholecystectomy.    DVT prophylaxis:  Medical DVT prophylaxis orders are present.    CODE STATUS:    Code Status (Patient has no pulse and is not breathing): CPR (Attempt to Resuscitate)  Medical Interventions (Patient has pulse or is breathing): Full Support      Electronically signed by Dandy Bonilla MD, 07/07/22, 12:15 PM EDT.

## 2022-07-07 NOTE — PLAN OF CARE
Goal Outcome Evaluation:               Patient requiring PRN pain and nausea medicine. Current NPO status - but allowed sips/chips. IV fluids and antibiotics infusing. Urinalysis sent to lab. Blood sugars checked and insulin administered. Possible surgery tomorrow. Patient ambulating independently to bathroom. Vitals stable.

## 2022-07-07 NOTE — CONSULTS
Patient states she has been prediabetic for approximately 8-10 years, and treated for diabetes with metformin for at least five years. Patient states her PCP just recently added Glucotrol XL to her diabetes regimen, within the last month or so.    Provided education on the method of action of Glucotrol XL, the possibility of hypoglycemia, and the importance of administering it with food. Provided patient with written material, Diabetes Survival Skills, and encouraged her to reach out with any questions she may have.

## 2022-07-08 LAB
ALBUMIN SERPL-MCNC: 3.2 G/DL (ref 3.5–5.2)
ALBUMIN/GLOB SERPL: 1.1 G/DL
ALP SERPL-CCNC: 164 U/L (ref 39–117)
ALT SERPL W P-5'-P-CCNC: 39 U/L (ref 1–33)
ANION GAP SERPL CALCULATED.3IONS-SCNC: 7.7 MMOL/L (ref 5–15)
AST SERPL-CCNC: 20 U/L (ref 1–32)
BACTERIA SPEC AEROBE CULT: NORMAL
BASOPHILS # BLD AUTO: 0.04 10*3/MM3 (ref 0–0.2)
BASOPHILS NFR BLD AUTO: 0.3 % (ref 0–1.5)
BILIRUB SERPL-MCNC: 1 MG/DL (ref 0–1.2)
BUN SERPL-MCNC: 6 MG/DL (ref 6–20)
BUN/CREAT SERPL: 8 (ref 7–25)
CALCIUM SPEC-SCNC: 9.1 MG/DL (ref 8.6–10.5)
CHLORIDE SERPL-SCNC: 102 MMOL/L (ref 98–107)
CO2 SERPL-SCNC: 24.3 MMOL/L (ref 22–29)
CREAT SERPL-MCNC: 0.75 MG/DL (ref 0.57–1)
DEPRECATED RDW RBC AUTO: 45 FL (ref 37–54)
EGFRCR SERPLBLD CKD-EPI 2021: 97.1 ML/MIN/1.73
EOSINOPHIL # BLD AUTO: 0.16 10*3/MM3 (ref 0–0.4)
EOSINOPHIL NFR BLD AUTO: 1.1 % (ref 0.3–6.2)
ERYTHROCYTE [DISTWIDTH] IN BLOOD BY AUTOMATED COUNT: 13.5 % (ref 12.3–15.4)
GLOBULIN UR ELPH-MCNC: 3 GM/DL
GLUCOSE BLDC GLUCOMTR-MCNC: 130 MG/DL (ref 70–99)
GLUCOSE BLDC GLUCOMTR-MCNC: 140 MG/DL (ref 70–99)
GLUCOSE BLDC GLUCOMTR-MCNC: 170 MG/DL (ref 70–99)
GLUCOSE SERPL-MCNC: 141 MG/DL (ref 65–99)
HCT VFR BLD AUTO: 38.9 % (ref 34–46.6)
HGB BLD-MCNC: 12 G/DL (ref 12–15.9)
IMM GRANULOCYTES # BLD AUTO: 0.08 10*3/MM3 (ref 0–0.05)
IMM GRANULOCYTES NFR BLD AUTO: 0.5 % (ref 0–0.5)
LYMPHOCYTES # BLD AUTO: 1.86 10*3/MM3 (ref 0.7–3.1)
LYMPHOCYTES NFR BLD AUTO: 12.4 % (ref 19.6–45.3)
MAGNESIUM SERPL-MCNC: 1.9 MG/DL (ref 1.6–2.6)
MCH RBC QN AUTO: 28 PG (ref 26.6–33)
MCHC RBC AUTO-ENTMCNC: 30.8 G/DL (ref 31.5–35.7)
MCV RBC AUTO: 90.7 FL (ref 79–97)
MONOCYTES # BLD AUTO: 1.06 10*3/MM3 (ref 0.1–0.9)
MONOCYTES NFR BLD AUTO: 7.1 % (ref 5–12)
NEUTROPHILS NFR BLD AUTO: 11.77 10*3/MM3 (ref 1.7–7)
NEUTROPHILS NFR BLD AUTO: 78.6 % (ref 42.7–76)
NRBC BLD AUTO-RTO: 0 /100 WBC (ref 0–0.2)
PHOSPHATE SERPL-MCNC: 2.3 MG/DL (ref 2.5–4.5)
PLATELET # BLD AUTO: 200 10*3/MM3 (ref 140–450)
PMV BLD AUTO: 10.7 FL (ref 6–12)
POTASSIUM SERPL-SCNC: 4.5 MMOL/L (ref 3.5–5.2)
PROT SERPL-MCNC: 6.2 G/DL (ref 6–8.5)
RBC # BLD AUTO: 4.29 10*6/MM3 (ref 3.77–5.28)
SODIUM SERPL-SCNC: 134 MMOL/L (ref 136–145)
WBC NRBC COR # BLD: 14.97 10*3/MM3 (ref 3.4–10.8)

## 2022-07-08 PROCEDURE — 25010000002 PIPERACILLIN SOD-TAZOBACTAM PER 1 G: Performed by: HOSPITALIST

## 2022-07-08 PROCEDURE — 99231 SBSQ HOSP IP/OBS SF/LOW 25: CPT | Performed by: INTERNAL MEDICINE

## 2022-07-08 PROCEDURE — 83735 ASSAY OF MAGNESIUM: CPT | Performed by: INTERNAL MEDICINE

## 2022-07-08 PROCEDURE — 99231 SBSQ HOSP IP/OBS SF/LOW 25: CPT | Performed by: SURGERY

## 2022-07-08 PROCEDURE — 25010000002 PROCHLORPERAZINE 10 MG/2ML SOLUTION: Performed by: INTERNAL MEDICINE

## 2022-07-08 PROCEDURE — 25010000002 ENOXAPARIN PER 10 MG: Performed by: HOSPITALIST

## 2022-07-08 PROCEDURE — 80053 COMPREHEN METABOLIC PANEL: CPT | Performed by: INTERNAL MEDICINE

## 2022-07-08 PROCEDURE — 99233 SBSQ HOSP IP/OBS HIGH 50: CPT | Performed by: INTERNAL MEDICINE

## 2022-07-08 PROCEDURE — 25010000002 ONDANSETRON PER 1 MG: Performed by: HOSPITALIST

## 2022-07-08 PROCEDURE — 84100 ASSAY OF PHOSPHORUS: CPT | Performed by: INTERNAL MEDICINE

## 2022-07-08 PROCEDURE — 82962 GLUCOSE BLOOD TEST: CPT

## 2022-07-08 PROCEDURE — 25010000002 HYDROMORPHONE 1 MG/ML SOLUTION: Performed by: HOSPITALIST

## 2022-07-08 PROCEDURE — 63710000001 INSULIN LISPRO (HUMAN) PER 5 UNITS: Performed by: INTERNAL MEDICINE

## 2022-07-08 PROCEDURE — 85025 COMPLETE CBC W/AUTO DIFF WBC: CPT | Performed by: INTERNAL MEDICINE

## 2022-07-08 RX ORDER — HYDROCODONE BITARTRATE AND ACETAMINOPHEN 10; 325 MG/1; MG/1
1 TABLET ORAL EVERY 4 HOURS PRN
Status: DISCONTINUED | OUTPATIENT
Start: 2022-07-08 | End: 2022-07-11 | Stop reason: HOSPADM

## 2022-07-08 RX ORDER — PROCHLORPERAZINE EDISYLATE 5 MG/ML
5 INJECTION INTRAMUSCULAR; INTRAVENOUS EVERY 6 HOURS PRN
Status: DISCONTINUED | OUTPATIENT
Start: 2022-07-08 | End: 2022-07-11 | Stop reason: HOSPADM

## 2022-07-08 RX ORDER — SODIUM CHLORIDE 9 MG/ML
100 INJECTION, SOLUTION INTRAVENOUS CONTINUOUS
Status: DISCONTINUED | OUTPATIENT
Start: 2022-07-08 | End: 2022-07-09

## 2022-07-08 RX ORDER — HYDROCODONE BITARTRATE AND ACETAMINOPHEN 5; 325 MG/1; MG/1
1 TABLET ORAL EVERY 6 HOURS PRN
Status: DISCONTINUED | OUTPATIENT
Start: 2022-07-08 | End: 2022-07-11 | Stop reason: HOSPADM

## 2022-07-08 RX ADMIN — ENOXAPARIN SODIUM 40 MG: 100 INJECTION SUBCUTANEOUS at 09:07

## 2022-07-08 RX ADMIN — HYDROCODONE BITARTRATE AND ACETAMINOPHEN 1 TABLET: 10; 325 TABLET ORAL at 15:58

## 2022-07-08 RX ADMIN — ENOXAPARIN SODIUM 40 MG: 100 INJECTION SUBCUTANEOUS at 20:09

## 2022-07-08 RX ADMIN — POTASSIUM & SODIUM PHOSPHATES POWDER PACK 280-160-250 MG 1 PACKET: 280-160-250 PACK at 11:50

## 2022-07-08 RX ADMIN — SENNOSIDES AND DOCUSATE SODIUM 2 TABLET: 50; 8.6 TABLET ORAL at 09:07

## 2022-07-08 RX ADMIN — Medication 250 MG: at 09:07

## 2022-07-08 RX ADMIN — VALSARTAN 80 MG: 80 TABLET, FILM COATED ORAL at 09:06

## 2022-07-08 RX ADMIN — ONDANSETRON 4 MG: 2 INJECTION INTRAMUSCULAR; INTRAVENOUS at 07:35

## 2022-07-08 RX ADMIN — SODIUM CHLORIDE 100 ML/HR: 9 INJECTION, SOLUTION INTRAVENOUS at 12:58

## 2022-07-08 RX ADMIN — HYDROMORPHONE HYDROCHLORIDE 1 MG: 1 INJECTION, SOLUTION INTRAMUSCULAR; INTRAVENOUS; SUBCUTANEOUS at 07:36

## 2022-07-08 RX ADMIN — SENNOSIDES AND DOCUSATE SODIUM 2 TABLET: 50; 8.6 TABLET ORAL at 20:09

## 2022-07-08 RX ADMIN — Medication 250 MG: at 20:09

## 2022-07-08 RX ADMIN — PROCHLORPERAZINE EDISYLATE 5 MG: 5 INJECTION INTRAMUSCULAR; INTRAVENOUS at 11:49

## 2022-07-08 RX ADMIN — ONDANSETRON 4 MG: 2 INJECTION INTRAMUSCULAR; INTRAVENOUS at 02:05

## 2022-07-08 RX ADMIN — PIPERACILLIN SODIUM AND TAZOBACTAM SODIUM 4.5 G: 4; 500 INJECTION, POWDER, FOR SOLUTION INTRAVENOUS at 09:07

## 2022-07-08 RX ADMIN — POTASSIUM & SODIUM PHOSPHATES POWDER PACK 280-160-250 MG 1 PACKET: 280-160-250 PACK at 17:34

## 2022-07-08 RX ADMIN — HYDROCODONE BITARTRATE AND ACETAMINOPHEN 1 TABLET: 10; 325 TABLET ORAL at 10:14

## 2022-07-08 RX ADMIN — SODIUM CHLORIDE 150 ML/HR: 9 INJECTION, SOLUTION INTRAVENOUS at 05:36

## 2022-07-08 RX ADMIN — HYDROMORPHONE HYDROCHLORIDE 1 MG: 1 INJECTION, SOLUTION INTRAMUSCULAR; INTRAVENOUS; SUBCUTANEOUS at 00:11

## 2022-07-08 RX ADMIN — Medication 5 MG: at 20:10

## 2022-07-08 RX ADMIN — HYDROCODONE BITARTRATE AND ACETAMINOPHEN 1 TABLET: 10; 325 TABLET ORAL at 20:09

## 2022-07-08 RX ADMIN — VENLAFAXINE 50 MG: 25 TABLET ORAL at 09:07

## 2022-07-08 RX ADMIN — Medication 10 ML: at 09:07

## 2022-07-08 RX ADMIN — Medication 10 ML: at 20:10

## 2022-07-08 RX ADMIN — PIPERACILLIN SODIUM AND TAZOBACTAM SODIUM 4.5 G: 4; 500 INJECTION, POWDER, FOR SOLUTION INTRAVENOUS at 01:56

## 2022-07-08 RX ADMIN — INSULIN LISPRO 2 UNITS: 100 INJECTION, SOLUTION INTRAVENOUS; SUBCUTANEOUS at 11:50

## 2022-07-08 RX ADMIN — PIPERACILLIN SODIUM AND TAZOBACTAM SODIUM 4.5 G: 4; 500 INJECTION, POWDER, FOR SOLUTION INTRAVENOUS at 17:23

## 2022-07-08 RX ADMIN — HYDROMORPHONE HYDROCHLORIDE 1 MG: 1 INJECTION, SOLUTION INTRAMUSCULAR; INTRAVENOUS; SUBCUTANEOUS at 03:39

## 2022-07-08 NOTE — PROGRESS NOTES
[facility]     Progress Note    Patient Name: Wilma Colmenares  : 1970  MRN: [id number]  Primary Care Physician:  Oumou Dacosta APRN  Date of admission: 2022    Subjective   Subjective     HPI:  Patient Reports continued issues with pain in the upper back.    Review of Systems    Objective   Objective     Vitals:   Temp:  [97.6 °F (36.4 °C)-98.6 °F (37 °C)] 97.6 °F (36.4 °C)  Heart Rate:  [83-91] 84  Resp:  [14-16] 14  BP: (108-119)/(52-63) 110/55    Physical Exam   She looks like she does not feel too good today.  Her abdomen is still little bit distended.  It is less tender in the epigastrium today.  Meds:  Scheduled Meds:enoxaparin, 40 mg, Subcutaneous, BID  insulin lispro, 0-9 Units, Subcutaneous, TID AC  piperacillin-tazobactam, 4.5 g, Intravenous, Q8H  saccharomyces boulardii, 250 mg, Oral, BID  senna-docusate sodium, 2 tablet, Oral, BID  sodium chloride, 10 mL, Intravenous, Q12H  valsartan, 80 mg, Oral, Daily  venlafaxine, 50 mg, Oral, Daily      Continuous Infusions:Pharmacy to Dose Zosyn,   sodium chloride, 150 mL/hr, Last Rate: 150 mL/hr (22 0536)      PRN Meds:.•  senna-docusate sodium **AND** polyethylene glycol **AND** bisacodyl **AND** bisacodyl  •  dextrose  •  dextrose  •  glucagon (human recombinant)  •  hydrALAZINE  •  HYDROmorphone **AND** naloxone  •  melatonin  •  ondansetron **OR** ondansetron  •  Pharmacy to Dose Zosyn  •  sodium chloride  •  sodium chloride  •  sodium chloride     Result Review    Result Review:  I have personally reviewed the results from the time of this admission to 2022 08:50 EDT and agree with these findings:  [x]  Laboratory  []  Microbiology  []  Radiology  []  EKG/Telemetry   []  Cardiology/Vascular   []  Pathology  []  Old records  []  Other:  Most notable findings include: White blood cell count of 15,000.  Alkaline phosphatase level of 164.  Albumin level of 3.2.    Assessment & Plan   Assessment / Plan     Brief Patient  Summary:  Wilma Colmenares is a 52 y.o. female who has gallstone pancreatitis.  She continues to have evidence of mild persistent pancreatitis.    Active Hospital Problems:  Active Hospital Problems    Diagnosis    • Calculus of gallbladder with acute cholecystitis and obstruction    • Elevated LFTs    • Acute pancreatitis    • Leukocytosis        Plan:   We will let her try some clear liquids today and see how she does with that.  I think it would probably be better to give her some additional time to resolve her pancreatitis so we will tentatively plan on following her up as an outpatient for interval cholecystectomy in 1 to 2 weeks.    DVT prophylaxis:  Medical DVT prophylaxis orders are present.    CODE STATUS:    Code Status (Patient has no pulse and is not breathing): CPR (Attempt to Resuscitate)  Medical Interventions (Patient has pulse or is breathing): Full Support      Electronically signed by Dandy Bonilla MD, 07/08/22, 8:50 AM EDT.

## 2022-07-08 NOTE — PLAN OF CARE
Goal Outcome Evaluation:  Plan of Care Reviewed With: patient           Outcome Evaluation: Continues to receive PRN nausea and pain medication. No c/o.

## 2022-07-08 NOTE — PROGRESS NOTES
[facility]  Inpatient Progress Note    Reason for Follow Up:  Gallstone pancreatitis    Subjective     Interval History:   Pt reports upper abd pain improved.  No nausea, vomiting.    Current Facility-Administered Medications:   •  sennosides-docusate (PERICOLACE) 8.6-50 MG per tablet 2 tablet, 2 tablet, Oral, BID, 2 tablet at 07/08/22 0907 **AND** polyethylene glycol (MIRALAX) packet 17 g, 17 g, Oral, Daily PRN **AND** bisacodyl (DULCOLAX) EC tablet 5 mg, 5 mg, Oral, Daily PRN **AND** bisacodyl (DULCOLAX) suppository 10 mg, 10 mg, Rectal, Daily PRN, Marisol Blue MD  •  dextrose (D50W) (25 g/50 mL) IV injection 25 g, 25 g, Intravenous, Q15 Min PRN, Robbi Gonzales MD  •  dextrose (GLUTOSE) oral gel 15 g, 15 g, Oral, Q15 Min PRN, Robbi Gonzales MD  •  Enoxaparin Sodium (LOVENOX) syringe 40 mg, 40 mg, Subcutaneous, BID, Marisol Blue MD, 40 mg at 07/08/22 0907  •  glucagon (human recombinant) (GLUCAGEN DIAGNOSTIC) injection 1 mg, 1 mg, Subcutaneous, Q15 Min PRN, Robbi Gonzales MD  •  HYDROcodone-acetaminophen (NORCO)  MG per tablet 1 tablet, 1 tablet, Oral, Q4H PRN, Robbi Gonzales MD, 1 tablet at 07/08/22 1014  •  HYDROcodone-acetaminophen (NORCO) 5-325 MG per tablet 1 tablet, 1 tablet, Oral, Q6H PRN, Robbi Gonzales MD  •  HYDROmorphone (DILAUDID) injection 1 mg, 1 mg, Intravenous, Q2H PRN, 1 mg at 07/08/22 0736 **AND** naloxone (NARCAN) injection 0.4 mg, 0.4 mg, Intravenous, Q5 Min PRN, Marisol Blue MD  •  Insulin Lispro (humaLOG) injection 0-9 Units, 0-9 Units, Subcutaneous, TID AC, Robbi Gonzales MD, 2 Units at 07/08/22 1150  •  melatonin tablet 5 mg, 5 mg, Oral, Nightly PRN, Marisol Blue MD  •  ondansetron (ZOFRAN) tablet 4 mg, 4 mg, Oral, Q6H PRN **OR** ondansetron (ZOFRAN) injection 4 mg, 4 mg, Intravenous, Q6H PRN, Marisol Blue MD, 4 mg at 07/08/22 0735  •  Pharmacy to Dose Zosyn, , Does not apply, Continuous PRN, Marisol Blue MD  •   piperacillin-tazobactam (ZOSYN) 4.5 g/100 mL 0.9% NS IVPB (mbp), 4.5 g, Intravenous, Q8H, Reba Mccartney DO, Last Rate: 0 mL/hr at 07/07/22 2125, 4.5 g at 07/08/22 0907  •  potassium & sodium phosphates (PHOS-NAK) oral packet, 1 packet, Oral, TID AC, Robbi Gonzales MD, 1 packet at 07/08/22 1150  •  prochlorperazine (COMPAZINE) injection 5 mg, 5 mg, Intravenous, Q6H PRN, Robbi Gonzales MD, 5 mg at 07/08/22 1149  •  saccharomyces boulardii (FLORASTOR) capsule 250 mg, 250 mg, Oral, BID, Robbi Gonzales MD, 250 mg at 07/08/22 0907  •  sodium chloride 0.9 % flush 10 mL, 10 mL, Intravenous, Q12H, Marisol Blue MD, 10 mL at 07/08/22 0907  •  sodium chloride 0.9 % infusion 40 mL, 40 mL, Intravenous, PRN, Marisol Blue MD  •  sodium chloride 0.9 % infusion, 100 mL/hr, Intravenous, Continuous, Robbi Gonzales MD, Last Rate: 100 mL/hr at 07/08/22 1258, 100 mL/hr at 07/08/22 1258  •  valsartan (DIOVAN) tablet 80 mg, 80 mg, Oral, Daily, Robbi Gonzales MD, 80 mg at 07/08/22 0906  •  venlafaxine (EFFEXOR) tablet 50 mg, 50 mg, Oral, Daily, Robbi Gonzales MD, 50 mg at 07/08/22 0907  Review of Systems:    The following systems were reviewed and negative;  constitution, respiratory and cardiovascular    Objective     Vital Signs  Temp:  [97.3 °F (36.3 °C)-98.6 °F (37 °C)] 97.3 °F (36.3 °C)  Heart Rate:  [81-91] 81  Resp:  [14-16] 15  BP: (108-133)/(55-75) 133/75  Body mass index is 45.3 kg/m².    Intake/Output Summary (Last 24 hours) at 7/8/2022 1325  Last data filed at 7/7/2022 2308  Gross per 24 hour   Intake 1207.5 ml   Output 250 ml   Net 957.5 ml     No intake/output data recorded.     Physical Exam:   General: awake, alert and in no acute distress   Eyes: eyes move symmetrical in all directions, no scleral icterus   Neck: supple, trachea is midline   Skin: warm and dry, not jaundiced   Cardiovascular: no chest tenderness   Pulm: breathing unlabored   Abdomen: soft, upper abd TTP, nondistended   Rectal:  deferred   Extremities: no rash or edema   Psychiatric: mental status within normal limits, alert and oriented      Results Review:     I reviewed the patient's new clinical results.    Results from last 7 days   Lab Units 07/08/22  0419 07/07/22 0439 07/06/22  1325   WBC 10*3/mm3 14.97* 16.95* 13.98*   HEMOGLOBIN g/dL 12.0 13.0 14.1   HEMATOCRIT % 38.9 41.3 43.1   PLATELETS 10*3/mm3 200 227 266     Results from last 7 days   Lab Units 07/08/22  0419 07/07/22  0439 07/06/22  1325   SODIUM mmol/L 134* 131* 132*   POTASSIUM mmol/L 4.5 4.5 4.2   CHLORIDE mmol/L 102 100 99   CO2 mmol/L 24.3 22.9 22.1   BUN mg/dL 6 7 9   CREATININE mg/dL 0.75 0.72 0.73   CALCIUM mg/dL 9.1 8.7 9.0   BILIRUBIN mg/dL 1.0 1.5* 1.5*   ALK PHOS U/L 164* 182* 218*   ALT (SGPT) U/L 39* 59* 83*   AST (SGOT) U/L 20 38* 72*   GLUCOSE mg/dL 141* 184* 235*         Lab Results   Lab Value Date/Time    LIPASE 171 (H) 07/07/2022 0439    LIPASE >3,000 (H) 07/05/2022 1936       [facility]:  [facility]      Assessment & Plan   Assessment:     1. Acute gallstone pancreatitis  2. Cholelithiasis   3. Elevated liver enzymes    Plan:     · MRCP negative for choledocholithiasis and LFTs/bili trending down; would not recommend ERCP at this time  · Advance diet as tolerated  · Timing of cholecystectomy per surgery  · Please call if can be of further assistance    I discussed the patients findings and my recommendations with patient.         Deonna Ahn M.D.  [place] [id number]  [id number]  Office: [phone]

## 2022-07-08 NOTE — PLAN OF CARE
Goal Outcome Evaluation:           Progress: improving    PT is AAOx4, VSS. Medicated with PRN [place] as needed for back/ABD pain. Medicated with PRN Zofran once this shift for nausea. PT reports effectiveness with both. Tolerating IV antibiotics and clear liquids. Up ad judy to toilet. No acute events this shift or new concerns voiced by patient this shift. Continue to monitor and with current plan of care at this time.

## 2022-07-08 NOTE — PROGRESS NOTES
[facility]   Hospitalist Progress Note    Date of admission: 7/5/2022  Patient Name: Wilma Colmenares  1970  Date: 7/7/2022      Subjective     Chief Complaint   Patient presents with   • Abdominal Pain       Summary: 52-year-old female with history of abdominal pain and cholelithiasis previously evaluated by surgery with plans for outpatient cholecystitis who presents with progressively worsening right upper quadrant abdominal pain over the past week.  She notes she has had issues with this intermittently over the past few months.  Patient found to have acute cholecystitis and acute pancreatitis suspected secondary to gallstones.  MRCP did not reveal choledocholithiasis and ERCP was not required initially.  GI assisted with evaluation.  Surgery consulted for evaluation for cholecystectomy and acute cholecystitis    Interval Followup: Please have improving abdominal pain but does note needing IV and as needed medications fairly frequently still.  Mild nausea occasionally but none currently.  No vomiting.  No recent bowel movement.  Still on ice chips/sips and does okay with this.  Pain when present is sharp and stabbing in nature mid upper abdominal and shoots to the back.  Patient denying some dysuria today.  No acute discharge or hematuria noted.     Review of Systems  No chest pain or palpitations  No fevers or chills    Objective     Vitals:   Temp:  [97.6 °F (36.4 °C)-99 °F (37.2 °C)] 97.7 °F (36.5 °C)  Heart Rate:  [79-94] 91  Resp:  [16-20] 16  BP: (108-118)/(52-64) 111/61    Physical Exam  Gen: awake, resting in bed, conversant  HENT: NCAT, mmm  Resp: CTA B no wheezes rhonchi or rales  CV: RRR, no LE pitting edema  GI: Abdomen soft, less tender appearing, nonrigid, nondistended, midepigastric TTP, positive bowel sounds  Psych: appropriate mood and affect, aox3  Skin: warm, dry    Result Review:  Vital signs, labs and any relevant imaging reviewed.        Assessment / Plan      Assessment/Plan:  Acute pancreatitis  Acute cholecystitis, calculus with obstruction  Elevated LFTs in setting of above  Severe morbid obesity BMI 45  Hyponatremia  Type 2 diabetes mellitus only on metformin as outpatient; A1c 9  Incidental 5.2 cm left adnexal cyst on imaging, outpatient follow-up for monitoring  Hepatic steatosis and hepatomegaly suspect in setting of fatty liver disease    -IV Zosyn and Florastor for acute cholecystitis.  Afebrile, white count increased to 17    *Check UA with reflex culture given dysuria.  Most likely if as a UTI would be covered by Zosyn.  - Symptoms improving, repeat lipase down to 171.  -Continue sips and chips.  Possible lap brody tomorrow after reevaluation by General surgery/Dr. Bonilla.  Appreciate assistance   - MRCP for pancreatitis, no choledocholithiasis, no acute ERCP required and given improving LFTs suspect this will be required.  Discussed with GI/Dr. Ahn.  - IV fluids with normal saline monitor electrolytes and volume status.  - IV and p.o. opiate, bowel regimen  -Continue SSI, monitor glucose improving, suspect once able to tolerate diet will have higher insulin requirements.  Will need increased regimen at discharge as A1c is 9 and suboptimally controlled.    -Continue home valsartan monitor blood pressure  -Continue home Effexor  -Monitor LFTs, white count  -follow up am labs  -rest per orders    Dispo pending pain control, ability to tolerate diet, having in response to surgery.  Not stable for discharge at this time    DVT prophylaxis:  Medical DVT prophylaxis orders are present.    Code Status (Patient has no pulse and is not breathing): CPR (Attempt to Resuscitate)  Medical Interventions (Patient has pulse or is breathing): Full Support        CBC    CBC 7/5/22 7/6/22 7/7/22   WBC 11.07 (A) 13.98 (A) 16.95 (A)   RBC 5.47 (A) 4.87 4.56   Hemoglobin 15.7 14.1 13.0   Hematocrit 47.9 (A) 43.1 41.3   MCV 87.6 88.5 90.6   MCH 28.7 29.0 28.5   MCHC 32.8  32.7 31.5   RDW 13.4 13.9 13.7   Platelets 303 266 227   (A) Abnormal value              CMP    CMP 7/5/22 7/6/22 7/7/22   Glucose 360 (A) 235 (A) 184 (A)   BUN 8 9 7   Creatinine 0.85 0.73 0.72   Sodium 132 (A) 132 (A) 131 (A)   Potassium 4.9 4.2 4.5   Chloride 95 (A) 99 100   Calcium 9.9 9.0 8.7   Albumin 4.60 4.00 3.50   Total Bilirubin 2.7 (A) 1.5 (A) 1.5 (A)   Alkaline Phosphatase 273 (A) 218 (A) 182 (A)   AST (SGOT) 131 (A) 72 (A) 38 (A)   ALT (SGPT) 109 (A) 83 (A) 59 (A)   (A) Abnormal value              Dispo: pending stability in clinical condition and appropriate discharge location.

## 2022-07-09 LAB
ALBUMIN SERPL-MCNC: 3.3 G/DL (ref 3.5–5.2)
ALBUMIN/GLOB SERPL: 1.1 G/DL
ALP SERPL-CCNC: 147 U/L (ref 39–117)
ALT SERPL W P-5'-P-CCNC: 28 U/L (ref 1–33)
ANION GAP SERPL CALCULATED.3IONS-SCNC: 8.7 MMOL/L (ref 5–15)
AST SERPL-CCNC: 16 U/L (ref 1–32)
BASOPHILS # BLD AUTO: 0.05 10*3/MM3 (ref 0–0.2)
BASOPHILS NFR BLD AUTO: 0.4 % (ref 0–1.5)
BILIRUB SERPL-MCNC: 0.7 MG/DL (ref 0–1.2)
BUN SERPL-MCNC: 5 MG/DL (ref 6–20)
BUN/CREAT SERPL: 6.8 (ref 7–25)
CALCIUM SPEC-SCNC: 8.9 MG/DL (ref 8.6–10.5)
CHLORIDE SERPL-SCNC: 101 MMOL/L (ref 98–107)
CO2 SERPL-SCNC: 26.3 MMOL/L (ref 22–29)
CREAT SERPL-MCNC: 0.73 MG/DL (ref 0.57–1)
DEPRECATED RDW RBC AUTO: 44.3 FL (ref 37–54)
EGFRCR SERPLBLD CKD-EPI 2021: 100.3 ML/MIN/1.73
EOSINOPHIL # BLD AUTO: 0.12 10*3/MM3 (ref 0–0.4)
EOSINOPHIL NFR BLD AUTO: 0.9 % (ref 0.3–6.2)
ERYTHROCYTE [DISTWIDTH] IN BLOOD BY AUTOMATED COUNT: 13.5 % (ref 12.3–15.4)
GLOBULIN UR ELPH-MCNC: 3 GM/DL
GLUCOSE BLDC GLUCOMTR-MCNC: 118 MG/DL (ref 70–99)
GLUCOSE BLDC GLUCOMTR-MCNC: 126 MG/DL (ref 70–99)
GLUCOSE BLDC GLUCOMTR-MCNC: 127 MG/DL (ref 70–99)
GLUCOSE BLDC GLUCOMTR-MCNC: 131 MG/DL (ref 70–99)
GLUCOSE SERPL-MCNC: 127 MG/DL (ref 65–99)
HCT VFR BLD AUTO: 36.9 % (ref 34–46.6)
HGB BLD-MCNC: 11.8 G/DL (ref 12–15.9)
IMM GRANULOCYTES # BLD AUTO: 0.07 10*3/MM3 (ref 0–0.05)
IMM GRANULOCYTES NFR BLD AUTO: 0.6 % (ref 0–0.5)
LYMPHOCYTES # BLD AUTO: 1.99 10*3/MM3 (ref 0.7–3.1)
LYMPHOCYTES NFR BLD AUTO: 15.7 % (ref 19.6–45.3)
MAGNESIUM SERPL-MCNC: 1.9 MG/DL (ref 1.6–2.6)
MCH RBC QN AUTO: 28.7 PG (ref 26.6–33)
MCHC RBC AUTO-ENTMCNC: 32 G/DL (ref 31.5–35.7)
MCV RBC AUTO: 89.8 FL (ref 79–97)
MONOCYTES # BLD AUTO: 0.9 10*3/MM3 (ref 0.1–0.9)
MONOCYTES NFR BLD AUTO: 7.1 % (ref 5–12)
NEUTROPHILS NFR BLD AUTO: 75.3 % (ref 42.7–76)
NEUTROPHILS NFR BLD AUTO: 9.58 10*3/MM3 (ref 1.7–7)
NRBC BLD AUTO-RTO: 0 /100 WBC (ref 0–0.2)
PHOSPHATE SERPL-MCNC: 2.4 MG/DL (ref 2.5–4.5)
PLATELET # BLD AUTO: 197 10*3/MM3 (ref 140–450)
PMV BLD AUTO: 11.4 FL (ref 6–12)
POTASSIUM SERPL-SCNC: 4.2 MMOL/L (ref 3.5–5.2)
PROT SERPL-MCNC: 6.3 G/DL (ref 6–8.5)
RBC # BLD AUTO: 4.11 10*6/MM3 (ref 3.77–5.28)
SODIUM SERPL-SCNC: 136 MMOL/L (ref 136–145)
WBC NRBC COR # BLD: 12.71 10*3/MM3 (ref 3.4–10.8)

## 2022-07-09 PROCEDURE — 83735 ASSAY OF MAGNESIUM: CPT | Performed by: INTERNAL MEDICINE

## 2022-07-09 PROCEDURE — 25010000002 ONDANSETRON PER 1 MG: Performed by: HOSPITALIST

## 2022-07-09 PROCEDURE — 99231 SBSQ HOSP IP/OBS SF/LOW 25: CPT | Performed by: SURGERY

## 2022-07-09 PROCEDURE — 85025 COMPLETE CBC W/AUTO DIFF WBC: CPT | Performed by: INTERNAL MEDICINE

## 2022-07-09 PROCEDURE — 99233 SBSQ HOSP IP/OBS HIGH 50: CPT | Performed by: INTERNAL MEDICINE

## 2022-07-09 PROCEDURE — 25010000002 PIPERACILLIN SOD-TAZOBACTAM PER 1 G: Performed by: HOSPITALIST

## 2022-07-09 PROCEDURE — 82962 GLUCOSE BLOOD TEST: CPT

## 2022-07-09 PROCEDURE — 80053 COMPREHEN METABOLIC PANEL: CPT | Performed by: INTERNAL MEDICINE

## 2022-07-09 PROCEDURE — 84100 ASSAY OF PHOSPHORUS: CPT | Performed by: INTERNAL MEDICINE

## 2022-07-09 PROCEDURE — 25010000002 ENOXAPARIN PER 10 MG: Performed by: HOSPITALIST

## 2022-07-09 RX ORDER — SODIUM CHLORIDE, SODIUM LACTATE, POTASSIUM CHLORIDE, CALCIUM CHLORIDE 600; 310; 30; 20 MG/100ML; MG/100ML; MG/100ML; MG/100ML
100 INJECTION, SOLUTION INTRAVENOUS CONTINUOUS
Status: ACTIVE | OUTPATIENT
Start: 2022-07-09 | End: 2022-07-10

## 2022-07-09 RX ADMIN — POTASSIUM & SODIUM PHOSPHATES POWDER PACK 280-160-250 MG 1 PACKET: 280-160-250 PACK at 16:51

## 2022-07-09 RX ADMIN — ENOXAPARIN SODIUM 40 MG: 100 INJECTION SUBCUTANEOUS at 22:02

## 2022-07-09 RX ADMIN — VENLAFAXINE 50 MG: 25 TABLET ORAL at 10:21

## 2022-07-09 RX ADMIN — PIPERACILLIN SODIUM AND TAZOBACTAM SODIUM 4.5 G: 4; 500 INJECTION, POWDER, FOR SOLUTION INTRAVENOUS at 03:09

## 2022-07-09 RX ADMIN — ENOXAPARIN SODIUM 40 MG: 100 INJECTION SUBCUTANEOUS at 10:21

## 2022-07-09 RX ADMIN — ONDANSETRON 4 MG: 2 INJECTION INTRAMUSCULAR; INTRAVENOUS at 16:51

## 2022-07-09 RX ADMIN — HYDROCODONE BITARTRATE AND ACETAMINOPHEN 1 TABLET: 10; 325 TABLET ORAL at 00:05

## 2022-07-09 RX ADMIN — HYDROCODONE BITARTRATE AND ACETAMINOPHEN 1 TABLET: 10; 325 TABLET ORAL at 22:03

## 2022-07-09 RX ADMIN — HYDROCODONE BITARTRATE AND ACETAMINOPHEN 1 TABLET: 10; 325 TABLET ORAL at 17:08

## 2022-07-09 RX ADMIN — PIPERACILLIN SODIUM AND TAZOBACTAM SODIUM 4.5 G: 4; 500 INJECTION, POWDER, FOR SOLUTION INTRAVENOUS at 10:20

## 2022-07-09 RX ADMIN — SENNOSIDES AND DOCUSATE SODIUM 2 TABLET: 50; 8.6 TABLET ORAL at 10:21

## 2022-07-09 RX ADMIN — HYDROCODONE BITARTRATE AND ACETAMINOPHEN 1 TABLET: 10; 325 TABLET ORAL at 05:03

## 2022-07-09 RX ADMIN — HYDROCODONE BITARTRATE AND ACETAMINOPHEN 1 TABLET: 10; 325 TABLET ORAL at 11:56

## 2022-07-09 RX ADMIN — Medication 250 MG: at 10:21

## 2022-07-09 RX ADMIN — PIPERACILLIN SODIUM AND TAZOBACTAM SODIUM 4.5 G: 4; 500 INJECTION, POWDER, FOR SOLUTION INTRAVENOUS at 17:08

## 2022-07-09 RX ADMIN — Medication 10 ML: at 22:04

## 2022-07-09 RX ADMIN — VALSARTAN 80 MG: 80 TABLET, FILM COATED ORAL at 10:21

## 2022-07-09 RX ADMIN — Medication 250 MG: at 22:02

## 2022-07-09 RX ADMIN — SENNOSIDES AND DOCUSATE SODIUM 2 TABLET: 50; 8.6 TABLET ORAL at 22:02

## 2022-07-09 RX ADMIN — Medication 5 MG: at 22:03

## 2022-07-09 RX ADMIN — POTASSIUM & SODIUM PHOSPHATES POWDER PACK 280-160-250 MG 1 PACKET: 280-160-250 PACK at 10:22

## 2022-07-09 RX ADMIN — SODIUM CHLORIDE, POTASSIUM CHLORIDE, SODIUM LACTATE AND CALCIUM CHLORIDE 100 ML/HR: 600; 310; 30; 20 INJECTION, SOLUTION INTRAVENOUS at 16:51

## 2022-07-09 NOTE — PROGRESS NOTES
[facility]   Hospitalist Progress Note    Date of admission: 7/5/2022  Patient Name: Wilma Colmenares  1970  Date: 7/9/2022      Subjective     Chief Complaint   Patient presents with   • Abdominal Pain       Summary: 52-year-old female with history of abdominal pain and cholelithiasis previously evaluated by surgery with plans for outpatient cholecystitis who presents with progressively worsening right upper quadrant abdominal pain over the past week.  She notes she has had issues with this intermittently over the past few months.  Patient found to have acute cholecystitis and acute pancreatitis suspected secondary to gallstones.  MRCP did not reveal choledocholithiasis and ERCP was not required initially.  GI assisted with evaluation.  Surgery consulted for evaluation for cholecystectomy and acute cholecystitis    Interval Followup: Doing okay somewhat with clears today.  Still having abdominal pain as sharp in nature at times radiating to the back with does not this does continue to slowly improve.  Is using less pain medications and trying to use oral pain meds.  Notes urine output is okay.  No dysuria.  Denies fevers chills.  Mild nausea, no vomiting    Review of Systems  No chest pain or palpitations  No fevers or chills    Objective     Vitals:   Temp:  [97.3 °F (36.3 °C)-98.7 °F (37.1 °C)] 98.3 °F (36.8 °C)  Heart Rate:  [71-84] 82  Resp:  [16-20] 20  BP: (116-134)/(58-73) 122/58    Physical Exam  Gen: awake, resting in bed, conversant, tired but less so than yesterday  HENT: NCAT, mmm  Resp: CTA B no crackles, wheezes rhonchi or rales  CV: RRR, no LE pitting edema  GI: Abdomen soft, moderate mid upper abdominal tenderness appears more referred from pain in the back, no guarding or rigidity, positive bowel sounds similar to yesterday  Psych: appropriate mood and affect, aox3  Skin: warm, dry    Result Review:  Vital signs, labs and any relevant imaging reviewed.        Assessment / Plan      Assessment/Plan:  Acute pancreatitis  Acute cholecystitis, calculus with obstruction   Elevated LFTs in setting of above  Severe morbid obesity BMI 45  Hyponatremia  UTI  Hematuria, repeat urinalysis as outpatient once infection cleared  Type 2 diabetes mellitus only on metformin as outpatient; A1c 9  Incidental 5.2 cm left adnexal cyst on imaging, outpatient follow-up for monitoring  Hepatic steatosis and hepatomegaly suspect in setting of fatty liver disease  Hypophosphatemia    -Discussed with Dr. Lane.  No acute surgical needs.  Follow-up after discharge with Dr. Bonilla in 1 to 2 weeks for cholecystectomy.  -IV Zosyn and Florastor for acute cholecystitis.  White count improving.  Afebrile.  Covering for possible UTI as well.  No further dysuria at this time   -ALT and AST within normal limits, alkaline phosphatase downtrending.    -Give additional 1 L of LR, but she later notes p.o. intake and liquids are improving.  Patient wants to try liquid diet for dinner.  We will advance as tolerated  -Replace phosphorus and continue to monitor electrolytes  - As needed oral and IV narcotics.    - SSI  - Valsartan, monitor blood pressure  - Effexor  - Continue to monitor labs  - Continue inpatient monitoring.  Hopefully home sometime early next week if diet continues to improve.  And able to wean off IV narcotics.    DVT prophylaxis:  Medical DVT prophylaxis orders are present.    Code Status (Patient has no pulse and is not breathing): CPR (Attempt to Resuscitate)  Medical Interventions (Patient has pulse or is breathing): Full Support        CBC    CBC 7/7/22 7/8/22 7/9/22   WBC 16.95 (A) 14.97 (A) 12.71 (A)   RBC 4.56 4.29 4.11   Hemoglobin 13.0 12.0 11.8 (A)   Hematocrit 41.3 38.9 36.9   MCV 90.6 90.7 89.8   MCH 28.5 28.0 28.7   MCHC 31.5 30.8 (A) 32.0   RDW 13.7 13.5 13.5   Platelets 227 200 197   (A) Abnormal value              CMP    CMP 7/7/22 7/8/22 7/9/22   Glucose 184 (A) 141 (A) 127 (A)   BUN 7 6 5  (A)   Creatinine 0.72 0.75 0.73   Sodium 131 (A) 134 (A) 136   Potassium 4.5 4.5 4.2   Chloride 100 102 101   Calcium 8.7 9.1 8.9   Albumin 3.50 3.20 (A) 3.30 (A)   Total Bilirubin 1.5 (A) 1.0 0.7   Alkaline Phosphatase 182 (A) 164 (A) 147 (A)   AST (SGOT) 38 (A) 20 16   ALT (SGPT) 59 (A) 39 (A) 28   (A) Abnormal value              Dispo: pending stability in clinical condition and appropriate discharge location.

## 2022-07-09 NOTE — PROGRESS NOTES
[facility]   Hospitalist Progress Note    Date of admission: 7/5/2022  Patient Name: Wilma Colmenares  1970  Date: 7/8/2022      Subjective     Chief Complaint   Patient presents with   • Abdominal Pain       Summary: 52-year-old female with history of abdominal pain and cholelithiasis previously evaluated by surgery with plans for outpatient cholecystitis who presents with progressively worsening right upper quadrant abdominal pain over the past week.  She notes she has had issues with this intermittently over the past few months.  Patient found to have acute cholecystitis and acute pancreatitis suspected secondary to gallstones.  MRCP did not reveal choledocholithiasis and ERCP was not required initially.  GI assisted with evaluation.  Surgery consulted for evaluation for cholecystectomy and acute cholecystitis    Interval Followup: Still with intermittent abdominal pain.  Does feel like it is slowly improving.  Trying some liquids today doing okay with this so far.  No acute shortness of air.  Does note that the pain  Her mid upper abdominal and now is mainly just shooting to the back at times.  No acute dysuria.    Review of Systems  No chest pain or palpitations  No fevers or chills    Objective     Vitals:   Temp:  [97.3 °F (36.3 °C)-98.1 °F (36.7 °C)] 97.7 °F (36.5 °C)  Heart Rate:  [76-84] 76  Resp:  [14-16] 16  BP: (110-141)/(52-76) 122/61    Physical Exam  Gen: awake, resting in bed, conversant appears somewhat uncomfortable  HENT: NCAT, mmm  Resp: CTA B no wheezes rhonchi or rales  CV: RRR, no LE pitting edema  GI: Abdomen soft, moderate mid upper abdominal tenderness appears more referred from pain in the back, no guarding or rigidity, positive bowel sounds   Psych: appropriate mood and affect, aox3  Skin: warm, dry    Result Review:  Vital signs, labs and any relevant imaging reviewed.        Assessment / Plan     Assessment/Plan:  Acute pancreatitis  Acute cholecystitis, calculus with  obstruction   Elevated LFTs in setting of above  Severe morbid obesity BMI 45  Hyponatremia  UTI  Hematuria, repeat urinalysis as outpatient once infection cleared  Type 2 diabetes mellitus only on metformin as outpatient; A1c 9  Incidental 5.2 cm left adnexal cyst on imaging, outpatient follow-up for monitoring  Hepatic steatosis and hepatomegaly suspect in setting of fatty liver disease  Hypophosphatemia    -IV Zosyn and Florastor for acute cholecystitis.  White count improving, continue to monitor.  Patient also likely with suspected UTI.  Urine culture only 25,000 CFU's however this exam after patient had already been established on Zosyn for over 24 hours suspect falsely negative.  UTI should be treated by current antibiotics  -LFTs continue to improve, MRCP negative for choledocholithiasis.  Discussed with Dr. Ahn no acute ERCP required.  Will call GI back if needed but not suspect intervention required at this time  - Replace phosphorus, continue to monitor  - IV Dilaudid as needed, add as needed Norco as well  - Clears today advancing as tolerated as per surgery.  Currently now plan for outpatient cholecystectomy in 1 to 2 weeks.  Timing as per Dr. Bonilla.  Appreciate assistance  - SSI  - Valsartan  - Effexor  - Continue to monitor labs,  - IV fluids, wean down as able/as p.o. intake improves  - Continue inpatient monitoring.  Hopefully home sometime early next week     DVT prophylaxis:  Medical DVT prophylaxis orders are present.    Code Status (Patient has no pulse and is not breathing): CPR (Attempt to Resuscitate)  Medical Interventions (Patient has pulse or is breathing): Full Support        CBC    CBC 7/6/22 7/7/22 7/8/22   WBC 13.98 (A) 16.95 (A) 14.97 (A)   RBC 4.87 4.56 4.29   Hemoglobin 14.1 13.0 12.0   Hematocrit 43.1 41.3 38.9   MCV 88.5 90.6 90.7   MCH 29.0 28.5 28.0   MCHC 32.7 31.5 30.8 (A)   RDW 13.9 13.7 13.5   Platelets 266 227 200   (A) Abnormal value              CMP    CMP 7/6/22  7/7/22 7/8/22   Glucose 235 (A) 184 (A) 141 (A)   BUN 9 7 6   Creatinine 0.73 0.72 0.75   Sodium 132 (A) 131 (A) 134 (A)   Potassium 4.2 4.5 4.5   Chloride 99 100 102   Calcium 9.0 8.7 9.1   Albumin 4.00 3.50 3.20 (A)   Total Bilirubin 1.5 (A) 1.5 (A) 1.0   Alkaline Phosphatase 218 (A) 182 (A) 164 (A)   AST (SGOT) 72 (A) 38 (A) 20   ALT (SGPT) 83 (A) 59 (A) 39 (A)   (A) Abnormal value              Dispo: pending stability in clinical condition and appropriate discharge location.

## 2022-07-09 NOTE — PROGRESS NOTES
LOS: 3 days   Patient Care Team:  Oumou Dacosta APRN as PCP - General (Nurse Practitioner)      Subjective     Interval History:   Doing ok, tolerating liquids but still with some abdominal discomfort, no new issues, does not feel like advancing diet    Objective     Vital Signs  Temp:  [97.3 °F (36.3 °C)-98.7 °F (37.1 °C)] 97.3 °F (36.3 °C)  Heart Rate:  [71-84] 84  Resp:  [14-20] 20  BP: (116-141)/(52-76) 134/73    Physical Exam:  NAD  Abd soft, nd, mild ttp     Results Review:     I reviewed the patient's new clinical results.      Assessment & Plan       Calculus of gallbladder with acute cholecystitis and obstruction    Elevated LFTs    Acute pancreatitis    Leukocytosis        Plan:  Keep on liquids  Plan for lap brody with Dr Bonilla in 1-2 weeks as outpatient  Will follow

## 2022-07-10 LAB
ALBUMIN SERPL-MCNC: 3.2 G/DL (ref 3.5–5.2)
ALBUMIN/GLOB SERPL: 1 G/DL
ALP SERPL-CCNC: 128 U/L (ref 39–117)
ALT SERPL W P-5'-P-CCNC: 20 U/L (ref 1–33)
ANION GAP SERPL CALCULATED.3IONS-SCNC: 7.4 MMOL/L (ref 5–15)
AST SERPL-CCNC: 15 U/L (ref 1–32)
BASOPHILS # BLD AUTO: 0.03 10*3/MM3 (ref 0–0.2)
BASOPHILS NFR BLD AUTO: 0.3 % (ref 0–1.5)
BILIRUB SERPL-MCNC: 0.6 MG/DL (ref 0–1.2)
BUN SERPL-MCNC: 5 MG/DL (ref 6–20)
BUN/CREAT SERPL: 7.4 (ref 7–25)
CALCIUM SPEC-SCNC: 9.2 MG/DL (ref 8.6–10.5)
CHLORIDE SERPL-SCNC: 99 MMOL/L (ref 98–107)
CO2 SERPL-SCNC: 27.6 MMOL/L (ref 22–29)
CREAT SERPL-MCNC: 0.68 MG/DL (ref 0.57–1)
DEPRECATED RDW RBC AUTO: 43.4 FL (ref 37–54)
EGFRCR SERPLBLD CKD-EPI 2021: 106.3 ML/MIN/1.73
EOSINOPHIL # BLD AUTO: 0.14 10*3/MM3 (ref 0–0.4)
EOSINOPHIL NFR BLD AUTO: 1.3 % (ref 0.3–6.2)
ERYTHROCYTE [DISTWIDTH] IN BLOOD BY AUTOMATED COUNT: 13.3 % (ref 12.3–15.4)
GLOBULIN UR ELPH-MCNC: 3.1 GM/DL
GLUCOSE BLDC GLUCOMTR-MCNC: 137 MG/DL (ref 70–99)
GLUCOSE SERPL-MCNC: 145 MG/DL (ref 65–99)
HCT VFR BLD AUTO: 37.5 % (ref 34–46.6)
HGB BLD-MCNC: 12 G/DL (ref 12–15.9)
IMM GRANULOCYTES # BLD AUTO: 0.05 10*3/MM3 (ref 0–0.05)
IMM GRANULOCYTES NFR BLD AUTO: 0.5 % (ref 0–0.5)
LYMPHOCYTES # BLD AUTO: 1.58 10*3/MM3 (ref 0.7–3.1)
LYMPHOCYTES NFR BLD AUTO: 14.9 % (ref 19.6–45.3)
MCH RBC QN AUTO: 28.4 PG (ref 26.6–33)
MCHC RBC AUTO-ENTMCNC: 32 G/DL (ref 31.5–35.7)
MCV RBC AUTO: 88.7 FL (ref 79–97)
MONOCYTES # BLD AUTO: 0.8 10*3/MM3 (ref 0.1–0.9)
MONOCYTES NFR BLD AUTO: 7.5 % (ref 5–12)
NEUTROPHILS NFR BLD AUTO: 75.5 % (ref 42.7–76)
NEUTROPHILS NFR BLD AUTO: 8.02 10*3/MM3 (ref 1.7–7)
NRBC BLD AUTO-RTO: 0 /100 WBC (ref 0–0.2)
PLATELET # BLD AUTO: 216 10*3/MM3 (ref 140–450)
PMV BLD AUTO: 10.6 FL (ref 6–12)
POTASSIUM SERPL-SCNC: 3.7 MMOL/L (ref 3.5–5.2)
PROT SERPL-MCNC: 6.3 G/DL (ref 6–8.5)
RBC # BLD AUTO: 4.23 10*6/MM3 (ref 3.77–5.28)
SODIUM SERPL-SCNC: 134 MMOL/L (ref 136–145)
WBC NRBC COR # BLD: 10.62 10*3/MM3 (ref 3.4–10.8)

## 2022-07-10 PROCEDURE — 25010000002 PIPERACILLIN SOD-TAZOBACTAM PER 1 G: Performed by: HOSPITALIST

## 2022-07-10 PROCEDURE — 82962 GLUCOSE BLOOD TEST: CPT

## 2022-07-10 PROCEDURE — 80053 COMPREHEN METABOLIC PANEL: CPT | Performed by: INTERNAL MEDICINE

## 2022-07-10 PROCEDURE — 85025 COMPLETE CBC W/AUTO DIFF WBC: CPT | Performed by: INTERNAL MEDICINE

## 2022-07-10 PROCEDURE — 99231 SBSQ HOSP IP/OBS SF/LOW 25: CPT | Performed by: SURGERY

## 2022-07-10 PROCEDURE — 25010000002 ENOXAPARIN PER 10 MG: Performed by: HOSPITALIST

## 2022-07-10 PROCEDURE — 25010000002 ONDANSETRON PER 1 MG: Performed by: HOSPITALIST

## 2022-07-10 PROCEDURE — 99233 SBSQ HOSP IP/OBS HIGH 50: CPT | Performed by: INTERNAL MEDICINE

## 2022-07-10 RX ORDER — SODIUM CHLORIDE, SODIUM LACTATE, POTASSIUM CHLORIDE, CALCIUM CHLORIDE 600; 310; 30; 20 MG/100ML; MG/100ML; MG/100ML; MG/100ML
100 INJECTION, SOLUTION INTRAVENOUS CONTINUOUS
Status: ACTIVE | OUTPATIENT
Start: 2022-07-10 | End: 2022-07-11

## 2022-07-10 RX ADMIN — Medication 250 MG: at 20:29

## 2022-07-10 RX ADMIN — VENLAFAXINE 50 MG: 25 TABLET ORAL at 10:42

## 2022-07-10 RX ADMIN — HYDROCODONE BITARTRATE AND ACETAMINOPHEN 1 TABLET: 10; 325 TABLET ORAL at 21:05

## 2022-07-10 RX ADMIN — HYDROCODONE BITARTRATE AND ACETAMINOPHEN 1 TABLET: 5; 325 TABLET ORAL at 15:02

## 2022-07-10 RX ADMIN — HYDROCODONE BITARTRATE AND ACETAMINOPHEN 1 TABLET: 5; 325 TABLET ORAL at 06:41

## 2022-07-10 RX ADMIN — SODIUM CHLORIDE, POTASSIUM CHLORIDE, SODIUM LACTATE AND CALCIUM CHLORIDE 100 ML/HR: 600; 310; 30; 20 INJECTION, SOLUTION INTRAVENOUS at 15:59

## 2022-07-10 RX ADMIN — ENOXAPARIN SODIUM 40 MG: 100 INJECTION SUBCUTANEOUS at 10:43

## 2022-07-10 RX ADMIN — PIPERACILLIN SODIUM AND TAZOBACTAM SODIUM 4.5 G: 4; 500 INJECTION, POWDER, FOR SOLUTION INTRAVENOUS at 18:04

## 2022-07-10 RX ADMIN — PIPERACILLIN SODIUM AND TAZOBACTAM SODIUM 4.5 G: 4; 500 INJECTION, POWDER, FOR SOLUTION INTRAVENOUS at 02:54

## 2022-07-10 RX ADMIN — ONDANSETRON 4 MG: 2 INJECTION INTRAMUSCULAR; INTRAVENOUS at 12:13

## 2022-07-10 RX ADMIN — ENOXAPARIN SODIUM 40 MG: 100 INJECTION SUBCUTANEOUS at 20:29

## 2022-07-10 RX ADMIN — POTASSIUM & SODIUM PHOSPHATES POWDER PACK 280-160-250 MG 1 PACKET: 280-160-250 PACK at 06:41

## 2022-07-10 RX ADMIN — Medication 10 ML: at 20:30

## 2022-07-10 RX ADMIN — PIPERACILLIN SODIUM AND TAZOBACTAM SODIUM 4.5 G: 4; 500 INJECTION, POWDER, FOR SOLUTION INTRAVENOUS at 10:43

## 2022-07-10 RX ADMIN — ONDANSETRON 4 MG: 2 INJECTION INTRAMUSCULAR; INTRAVENOUS at 19:07

## 2022-07-10 RX ADMIN — SENNOSIDES AND DOCUSATE SODIUM 2 TABLET: 50; 8.6 TABLET ORAL at 20:29

## 2022-07-10 RX ADMIN — SENNOSIDES AND DOCUSATE SODIUM 2 TABLET: 50; 8.6 TABLET ORAL at 10:42

## 2022-07-10 RX ADMIN — Medication 250 MG: at 10:42

## 2022-07-10 RX ADMIN — VALSARTAN 80 MG: 80 TABLET, FILM COATED ORAL at 10:42

## 2022-07-10 NOTE — PROGRESS NOTES
LOS: 4 days   Patient Care Team:  Oumou Dacosta APRN as PCP - General (Nurse Practitioner)      Subjective     Interval History:   Doing well, pain improved, tolerating fulls, no new issues    Objective     Vital Signs  Temp:  [97.3 °F (36.3 °C)-99.2 °F (37.3 °C)] 97.7 °F (36.5 °C)  Heart Rate:  [70-85] 70  Resp:  [16-20] 16  BP: (122-134)/(58-73) 126/65    Physical Exam:  NAD  Abd soft, nd, nt     Results Review:     I reviewed the patient's new clinical results.      Assessment & Plan       Calculus of gallbladder with acute cholecystitis and obstruction    Elevated LFTs    Acute pancreatitis    Leukocytosis        Plan:  Advance to low fat diet  Plan for lap brody in one to two weeks per Dr Bonilla

## 2022-07-11 ENCOUNTER — TELEPHONE (OUTPATIENT)
Dept: SURGERY | Facility: CLINIC | Age: 52
End: 2022-07-11

## 2022-07-11 VITALS
TEMPERATURE: 97.9 F | SYSTOLIC BLOOD PRESSURE: 139 MMHG | WEIGHT: 280.65 LBS | BODY MASS INDEX: 45.1 KG/M2 | OXYGEN SATURATION: 93 % | HEIGHT: 66 IN | DIASTOLIC BLOOD PRESSURE: 74 MMHG | RESPIRATION RATE: 16 BRPM | HEART RATE: 69 BPM

## 2022-07-11 LAB
ALBUMIN SERPL-MCNC: 3.1 G/DL (ref 3.5–5.2)
ALBUMIN/GLOB SERPL: 1 G/DL
ALP SERPL-CCNC: 120 U/L (ref 39–117)
ALT SERPL W P-5'-P-CCNC: 17 U/L (ref 1–33)
ANION GAP SERPL CALCULATED.3IONS-SCNC: 8.4 MMOL/L (ref 5–15)
AST SERPL-CCNC: 17 U/L (ref 1–32)
BASOPHILS # BLD AUTO: 0.03 10*3/MM3 (ref 0–0.2)
BASOPHILS NFR BLD AUTO: 0.3 % (ref 0–1.5)
BILIRUB SERPL-MCNC: 0.4 MG/DL (ref 0–1.2)
BUN SERPL-MCNC: 6 MG/DL (ref 6–20)
BUN/CREAT SERPL: 8.6 (ref 7–25)
CALCIUM SPEC-SCNC: 9.1 MG/DL (ref 8.6–10.5)
CHLORIDE SERPL-SCNC: 100 MMOL/L (ref 98–107)
CO2 SERPL-SCNC: 27.6 MMOL/L (ref 22–29)
CREAT SERPL-MCNC: 0.7 MG/DL (ref 0.57–1)
DEPRECATED RDW RBC AUTO: 43.2 FL (ref 37–54)
EGFRCR SERPLBLD CKD-EPI 2021: 105.5 ML/MIN/1.73
EOSINOPHIL # BLD AUTO: 0.15 10*3/MM3 (ref 0–0.4)
EOSINOPHIL NFR BLD AUTO: 1.6 % (ref 0.3–6.2)
ERYTHROCYTE [DISTWIDTH] IN BLOOD BY AUTOMATED COUNT: 13.2 % (ref 12.3–15.4)
GLOBULIN UR ELPH-MCNC: 3.2 GM/DL
GLUCOSE SERPL-MCNC: 130 MG/DL (ref 65–99)
HCT VFR BLD AUTO: 36.5 % (ref 34–46.6)
HGB BLD-MCNC: 11.7 G/DL (ref 12–15.9)
IMM GRANULOCYTES # BLD AUTO: 0.06 10*3/MM3 (ref 0–0.05)
IMM GRANULOCYTES NFR BLD AUTO: 0.6 % (ref 0–0.5)
LYMPHOCYTES # BLD AUTO: 1.85 10*3/MM3 (ref 0.7–3.1)
LYMPHOCYTES NFR BLD AUTO: 19.5 % (ref 19.6–45.3)
MCH RBC QN AUTO: 28.3 PG (ref 26.6–33)
MCHC RBC AUTO-ENTMCNC: 32.1 G/DL (ref 31.5–35.7)
MCV RBC AUTO: 88.4 FL (ref 79–97)
MONOCYTES # BLD AUTO: 0.7 10*3/MM3 (ref 0.1–0.9)
MONOCYTES NFR BLD AUTO: 7.4 % (ref 5–12)
NEUTROPHILS NFR BLD AUTO: 6.72 10*3/MM3 (ref 1.7–7)
NEUTROPHILS NFR BLD AUTO: 70.6 % (ref 42.7–76)
NRBC BLD AUTO-RTO: 0 /100 WBC (ref 0–0.2)
PLATELET # BLD AUTO: 230 10*3/MM3 (ref 140–450)
PMV BLD AUTO: 10.5 FL (ref 6–12)
POTASSIUM SERPL-SCNC: 3.6 MMOL/L (ref 3.5–5.2)
PROT SERPL-MCNC: 6.3 G/DL (ref 6–8.5)
RBC # BLD AUTO: 4.13 10*6/MM3 (ref 3.77–5.28)
SODIUM SERPL-SCNC: 136 MMOL/L (ref 136–145)
WBC NRBC COR # BLD: 9.51 10*3/MM3 (ref 3.4–10.8)

## 2022-07-11 PROCEDURE — 85025 COMPLETE CBC W/AUTO DIFF WBC: CPT | Performed by: INTERNAL MEDICINE

## 2022-07-11 PROCEDURE — 97161 PT EVAL LOW COMPLEX 20 MIN: CPT

## 2022-07-11 PROCEDURE — 80053 COMPREHEN METABOLIC PANEL: CPT | Performed by: INTERNAL MEDICINE

## 2022-07-11 PROCEDURE — 25010000002 ONDANSETRON PER 1 MG: Performed by: HOSPITALIST

## 2022-07-11 PROCEDURE — 99231 SBSQ HOSP IP/OBS SF/LOW 25: CPT | Performed by: NURSE PRACTITIONER

## 2022-07-11 PROCEDURE — 25010000002 ENOXAPARIN PER 10 MG: Performed by: HOSPITALIST

## 2022-07-11 PROCEDURE — 99239 HOSP IP/OBS DSCHRG MGMT >30: CPT | Performed by: INTERNAL MEDICINE

## 2022-07-11 PROCEDURE — 25010000002 PIPERACILLIN SOD-TAZOBACTAM PER 1 G: Performed by: HOSPITALIST

## 2022-07-11 RX ORDER — HYDROCODONE BITARTRATE AND ACETAMINOPHEN 10; 325 MG/1; MG/1
1 TABLET ORAL EVERY 6 HOURS PRN
Qty: 12 TABLET | Refills: 0 | Status: SHIPPED | OUTPATIENT
Start: 2022-07-11

## 2022-07-11 RX ORDER — AMOXICILLIN AND CLAVULANATE POTASSIUM 875; 125 MG/1; MG/1
1 TABLET, FILM COATED ORAL 2 TIMES DAILY
Qty: 3 TABLET | Refills: 0 | Status: SHIPPED | OUTPATIENT
Start: 2022-07-11 | End: 2022-07-18

## 2022-07-11 RX ORDER — ONDANSETRON 4 MG/1
4 TABLET, ORALLY DISINTEGRATING ORAL EVERY 6 HOURS PRN
Qty: 16 TABLET | Refills: 0 | Status: SHIPPED | OUTPATIENT
Start: 2022-07-11

## 2022-07-11 RX ORDER — AMOXICILLIN 250 MG
2 CAPSULE ORAL 2 TIMES DAILY
Qty: 20 TABLET | Refills: 0 | Status: SHIPPED | OUTPATIENT
Start: 2022-07-11 | End: 2022-07-19 | Stop reason: HOSPADM

## 2022-07-11 RX ADMIN — SENNOSIDES AND DOCUSATE SODIUM 2 TABLET: 50; 8.6 TABLET ORAL at 08:41

## 2022-07-11 RX ADMIN — HYDROCODONE BITARTRATE AND ACETAMINOPHEN 1 TABLET: 10; 325 TABLET ORAL at 03:51

## 2022-07-11 RX ADMIN — VALSARTAN 80 MG: 80 TABLET, FILM COATED ORAL at 08:40

## 2022-07-11 RX ADMIN — ENOXAPARIN SODIUM 40 MG: 100 INJECTION SUBCUTANEOUS at 08:41

## 2022-07-11 RX ADMIN — Medication 250 MG: at 08:40

## 2022-07-11 RX ADMIN — PIPERACILLIN SODIUM AND TAZOBACTAM SODIUM 4.5 G: 4; 500 INJECTION, POWDER, FOR SOLUTION INTRAVENOUS at 02:11

## 2022-07-11 RX ADMIN — VENLAFAXINE 50 MG: 25 TABLET ORAL at 08:40

## 2022-07-11 RX ADMIN — Medication 10 ML: at 08:41

## 2022-07-11 RX ADMIN — ONDANSETRON 4 MG: 2 INJECTION INTRAMUSCULAR; INTRAVENOUS at 08:46

## 2022-07-11 RX ADMIN — PIPERACILLIN SODIUM AND TAZOBACTAM SODIUM 4.5 G: 4; 500 INJECTION, POWDER, FOR SOLUTION INTRAVENOUS at 09:43

## 2022-07-11 NOTE — PLAN OF CARE
Goal Outcome Evaluation:  Plan of Care Reviewed With: patient        Progress: no change  Outcome Evaluation: Patient not appropriate for skilled PT services at this time as she has not had a decline in functional mobility. Patient transferring and ambulating independently and is safe to continue doing so until her discharge from the hospital. D/C from PT caseload at this time with patient in agreement.

## 2022-07-11 NOTE — PLAN OF CARE
Goal Outcome Evaluation: Pt's pain treated per MAR. Pt's nausea treated at beginning of shift, but otherwise pt has not reported additional nausea so far. Pt pleasant and compliant with care. Pt has had no additional changes or complaints so far. Poornima Smith RN

## 2022-07-11 NOTE — PROGRESS NOTES
pro   PROGRESS NOTE     Patient Name:  Wilma Colmenares  YOB: 1970  [id number]   LOS: 5 days   * No surgery found *  Patient Care Team:  Oumou Dacosta APRN as PCP - General (Nurse Practitioner)          Subjective     Interval History: feels better today.  No pain meds since last evening.  No nausea with breakfast.       Review of Systems:    All complete review of systems was performed and all are negative except what is documented in the HPI.       Objective     Vital Signs  Temp:  [97.2 °F (36.2 °C)-97.9 °F (36.6 °C)] 97.9 °F (36.6 °C)  Heart Rate:  [63-69] 69  Resp:  [16-18] 16  BP: (117-139)/(65-74) 139/74    Physical Exam:     General Appearance:   Alert, cooperative, in no acute distress   Head:   Normocephalic, without obvious abnormality, atraumatic   Eyes:            Lids and lashes normal, conjunctivae and sclerae normal, no     Icterus    Ears:   Ears appear intact with no abnormalities noted   Throat:  No oral lesions, oral mucosa moist  Neck: supple, trachea midline    Lungs:    Respirations regular, even and unlabored    Heart:   Regular rhythm and normal rate   Chest Wall:   No abnormalities observed   Abdomen:    no masses, no organomegaly, soft non-tender, non-distended, no guarding   Extremities:  Moves all extremities well, no edema, no cyanosis, no               redness   Skin:  No bleeding, bruising or rash   Neurologic:  A/Ox3 with no deficits        Results Review:     I reviewed the patient's new clinical results.    LABS:  Lab Results (last 72 hours)     Procedure Component Value Units Date/Time    Comprehensive Metabolic Panel [[id number]]  (Abnormal) Collected: 07/11/22 0628    Specimen: Blood Updated: 07/11/22 0720     Glucose 130 mg/dL      BUN 6 mg/dL      Creatinine 0.70 mg/dL      Sodium 136 mmol/L      Potassium 3.6 mmol/L      Chloride 100 mmol/L      CO2 27.6 mmol/L      Calcium 9.1 mg/dL      Total Protein 6.3 g/dL      Albumin 3.10 g/dL      ALT (SGPT) 17 U/L       AST (SGOT) 17 U/L      Alkaline Phosphatase 120 U/L      Total Bilirubin 0.4 mg/dL      Globulin 3.2 gm/dL      A/G Ratio 1.0 g/dL      BUN/Creatinine Ratio 8.6     Anion Gap 8.4 mmol/L      eGFR 105.5 mL/min/1.73      Comment: National Kidney Foundation and American Society of Nephrology (ASN) Task Force recommended calculation based on the Chronic Kidney Disease Epidemiology Collaboration (CKD-EPI) equation refit without adjustment for race.       Narrative:      GFR Normal >60  Chronic Kidney Disease <60  Kidney Failure <15      CBC & Differential [[id number]]  (Abnormal) Collected: 07/11/22 0628    Specimen: Blood Updated: 07/11/22 0658    Narrative:      The following orders were created for panel order CBC & Differential.  Procedure                               Abnormality         Status                     ---------                               -----------         ------                     CBC Auto Differential[[id number]]        Abnormal            Final result                 Please view results for these tests on the individual orders.    CBC Auto Differential [[id number]]  (Abnormal) Collected: 07/11/22 0628    Specimen: Blood Updated: 07/11/22 0658     WBC 9.51 10*3/mm3      RBC 4.13 10*6/mm3      Hemoglobin 11.7 g/dL      Hematocrit 36.5 %      MCV 88.4 fL      MCH 28.3 pg      MCHC 32.1 g/dL      RDW 13.2 %      RDW-SD 43.2 fl      MPV 10.5 fL      Platelets 230 10*3/mm3      Neutrophil % 70.6 %      Lymphocyte % 19.5 %      Monocyte % 7.4 %      Eosinophil % 1.6 %      Basophil % 0.3 %      Immature Grans % 0.6 %      Neutrophils, Absolute 6.72 10*3/mm3      Lymphocytes, Absolute 1.85 10*3/mm3      Monocytes, Absolute 0.70 10*3/mm3      Eosinophils, Absolute 0.15 10*3/mm3      Basophils, Absolute 0.03 10*3/mm3      Immature Grans, Absolute 0.06 10*3/mm3      nRBC 0.0 /100 WBC     Comprehensive Metabolic Panel [[id number]]  (Abnormal) Collected: 07/10/22 0712    Specimen: Blood Updated: 07/10/22  0758     Glucose 145 mg/dL      BUN 5 mg/dL      Creatinine 0.68 mg/dL      Sodium 134 mmol/L      Potassium 3.7 mmol/L      Chloride 99 mmol/L      CO2 27.6 mmol/L      Calcium 9.2 mg/dL      Total Protein 6.3 g/dL      Albumin 3.20 g/dL      ALT (SGPT) 20 U/L      AST (SGOT) 15 U/L      Alkaline Phosphatase 128 U/L      Total Bilirubin 0.6 mg/dL      Globulin 3.1 gm/dL      A/G Ratio 1.0 g/dL      BUN/Creatinine Ratio 7.4     Anion Gap 7.4 mmol/L      eGFR 106.3 mL/min/1.73      Comment: National Kidney Foundation and American Society of Nephrology (ASN) Task Force recommended calculation based on the Chronic Kidney Disease Epidemiology Collaboration (CKD-EPI) equation refit without adjustment for race.       Narrative:      GFR Normal >60  Chronic Kidney Disease <60  Kidney Failure <15      CBC & Differential [[id number]]  (Abnormal) Collected: 07/10/22 0712    Specimen: Blood Updated: 07/10/22 0731    Narrative:      The following orders were created for panel order CBC & Differential.  Procedure                               Abnormality         Status                     ---------                               -----------         ------                     CBC Auto Differential[[id number]]        Abnormal            Final result                 Please view results for these tests on the individual orders.    CBC Auto Differential [[id number]]  (Abnormal) Collected: 07/10/22 0712    Specimen: Blood Updated: 07/10/22 0731     WBC 10.62 10*3/mm3      RBC 4.23 10*6/mm3      Hemoglobin 12.0 g/dL      Hematocrit 37.5 %      MCV 88.7 fL      MCH 28.4 pg      MCHC 32.0 g/dL      RDW 13.3 %      RDW-SD 43.4 fl      MPV 10.6 fL      Platelets 216 10*3/mm3      Neutrophil % 75.5 %      Lymphocyte % 14.9 %      Monocyte % 7.5 %      Eosinophil % 1.3 %      Basophil % 0.3 %      Immature Grans % 0.5 %      Neutrophils, Absolute 8.02 10*3/mm3      Lymphocytes, Absolute 1.58 10*3/mm3      Monocytes, Absolute 0.80 10*3/mm3       Eosinophils, Absolute 0.14 10*3/mm3      Basophils, Absolute 0.03 10*3/mm3      Immature Grans, Absolute 0.05 10*3/mm3      nRBC 0.0 /100 WBC     POC Glucose Once [[id number]]  (Abnormal) Collected: 07/10/22 0704    Specimen: Blood Updated: 07/10/22 0710     Glucose 137 mg/dL      Comment: Serial Number: [id number]:  [id number]       POC Glucose Once [[id number]]  (Abnormal) Collected: 07/09/22 2241    Specimen: Blood Updated: 07/09/22 2242     Glucose 131 mg/dL      Comment: Serial Number: [id number]:  [id number]       POC Glucose Once [[id number]]  (Abnormal) Collected: 07/09/22 1632    Specimen: Blood Updated: 07/09/22 1633     Glucose 118 mg/dL      Comment: Serial Number: [id number]:  [id number]       POC Glucose Once [[id number]]  (Abnormal) Collected: 07/09/22 1200    Specimen: Blood Updated: 07/09/22 1202     Glucose 126 mg/dL      Comment: Serial Number: [id number]:  [id number]       Comprehensive Metabolic Panel [[id number]]  (Abnormal) Collected: 07/09/22 0408    Specimen: Blood Updated: 07/09/22 0534     Glucose 127 mg/dL      BUN 5 mg/dL      Creatinine 0.73 mg/dL      Sodium 136 mmol/L      Potassium 4.2 mmol/L      Chloride 101 mmol/L      CO2 26.3 mmol/L      Calcium 8.9 mg/dL      Total Protein 6.3 g/dL      Albumin 3.30 g/dL      ALT (SGPT) 28 U/L      AST (SGOT) 16 U/L      Alkaline Phosphatase 147 U/L      Total Bilirubin 0.7 mg/dL      Globulin 3.0 gm/dL      A/G Ratio 1.1 g/dL      BUN/Creatinine Ratio 6.8     Anion Gap 8.7 mmol/L      eGFR 100.3 mL/min/1.73      Comment: National Kidney Foundation and American Society of Nephrology (ASN) Task Force recommended calculation based on the Chronic Kidney Disease Epidemiology Collaboration (CKD-EPI) equation refit without adjustment for race.       Narrative:      GFR Normal >60  Chronic Kidney Disease <60  Kidney Failure <15      Phosphorus [[id number]]  (Abnormal) Collected: 07/09/22 0408    Specimen: Blood Updated: 07/09/22 0534      Phosphorus 2.4 mg/dL     Magnesium [[id number]]  (Normal) Collected: 07/09/22 0408    Specimen: Blood Updated: 07/09/22 0534     Magnesium 1.9 mg/dL     CBC & Differential [[id number]]  (Abnormal) Collected: 07/09/22 0408    Specimen: Blood Updated: 07/09/22 0507    Narrative:      The following orders were created for panel order CBC & Differential.  Procedure                               Abnormality         Status                     ---------                               -----------         ------                     CBC Auto Differential[[id number]]        Abnormal            Final result                 Please view results for these tests on the individual orders.    CBC Auto Differential [[id number]]  (Abnormal) Collected: 07/09/22 0408    Specimen: Blood Updated: 07/09/22 0507     WBC 12.71 10*3/mm3      RBC 4.11 10*6/mm3      Hemoglobin 11.8 g/dL      Hematocrit 36.9 %      MCV 89.8 fL      MCH 28.7 pg      MCHC 32.0 g/dL      RDW 13.5 %      RDW-SD 44.3 fl      MPV 11.4 fL      Platelets 197 10*3/mm3      Neutrophil % 75.3 %      Lymphocyte % 15.7 %      Monocyte % 7.1 %      Eosinophil % 0.9 %      Basophil % 0.4 %      Immature Grans % 0.6 %      Neutrophils, Absolute 9.58 10*3/mm3      Lymphocytes, Absolute 1.99 10*3/mm3      Monocytes, Absolute 0.90 10*3/mm3      Eosinophils, Absolute 0.12 10*3/mm3      Basophils, Absolute 0.05 10*3/mm3      Immature Grans, Absolute 0.07 10*3/mm3      nRBC 0.0 /100 WBC     POC Glucose Once [[id number]]  (Abnormal) Collected: 07/09/22 0008    Specimen: Blood Updated: 07/09/22 0009     Glucose 127 mg/dL      Comment: Serial Number: [id number]:  [id number]       POC Glucose Once [[id number]]  (Abnormal) Collected: 07/08/22 1711    Specimen: Blood Updated: 07/08/22 1713     Glucose 140 mg/dL      Comment: Serial Number: [id number]:  [id number]       Urine Culture - Urine, Urine, Clean Catch [[id number]] Collected: 07/07/22 1531    Specimen: Urine, Clean Catch  Updated: 07/08/22 1527     Urine Culture 25,000 CFU/mL Mixed Massiel Isolated    Narrative:      Specimen contains mixed organisms of questionable pathogenicity suggestive of contamination. If symptoms persist, suggest recollection.  Colonization of the urinary tract without infection is common. Treatment is discouraged unless the patient is symptomatic, pregnant, or undergoing an invasive urologic procedure.          IMAGING:  Imaging Results (Last 72 Hours)     ** No results found for the last 72 hours. **          Medications:    Current Facility-Administered Medications:   •  sennosides-docusate (PERICOLACE) 8.6-50 MG per tablet 2 tablet, 2 tablet, Oral, BID, 2 tablet at 07/11/22 0841 **AND** polyethylene glycol (MIRALAX) packet 17 g, 17 g, Oral, Daily PRN **AND** bisacodyl (DULCOLAX) EC tablet 5 mg, 5 mg, Oral, Daily PRN **AND** bisacodyl (DULCOLAX) suppository 10 mg, 10 mg, Rectal, Daily PRN, Marisol Blue MD  •  dextrose (D50W) (25 g/50 mL) IV injection 25 g, 25 g, Intravenous, Q15 Min PRN, Robbi Gonzales MD  •  dextrose (GLUTOSE) oral gel 15 g, 15 g, Oral, Q15 Min PRN, Robbi Gonzales MD  •  Enoxaparin Sodium (LOVENOX) syringe 40 mg, 40 mg, Subcutaneous, BID, Marisol Blue MD, 40 mg at 07/11/22 0841  •  glucagon (human recombinant) (GLUCAGEN DIAGNOSTIC) injection 1 mg, 1 mg, Subcutaneous, Q15 Min PRN, Robbi Gonzales MD  •  HYDROcodone-acetaminophen (NORCO)  MG per tablet 1 tablet, 1 tablet, Oral, Q4H PRN, Robbi Gonzales MD, 1 tablet at 07/11/22 0351  •  HYDROcodone-acetaminophen (NORCO) 5-325 MG per tablet 1 tablet, 1 tablet, Oral, Q6H PRN, Robbi Gonzales MD, 1 tablet at 07/10/22 1502  •  HYDROmorphone (DILAUDID) injection 1 mg, 1 mg, Intravenous, Q2H PRN, 1 mg at 07/08/22 0736 **AND** naloxone (NARCAN) injection 0.4 mg, 0.4 mg, Intravenous, Q5 Min PRN, Marisol Blue MD  •  melatonin tablet 5 mg, 5 mg, Oral, Nightly PRN, Marisol Blue MD, 5 mg at 07/09/22 2313  •  ondansetron  (ZOFRAN) tablet 4 mg, 4 mg, Oral, Q6H PRN **OR** ondansetron (ZOFRAN) injection 4 mg, 4 mg, Intravenous, Q6H PRN, Marisol Blue MD, 4 mg at 07/11/22 0846  •  Pharmacy to Dose Zosyn, , Does not apply, Continuous PRN, Marisol Blue MD  •  piperacillin-tazobactam (ZOSYN) 4.5 g/100 mL 0.9% NS IVPB (mbp), 4.5 g, Intravenous, Q8H, Reba Mccartney DO, Last Rate: 0 mL/hr at 07/07/22 2125, 4.5 g at 07/11/22 0943  •  prochlorperazine (COMPAZINE) injection 5 mg, 5 mg, Intravenous, Q6H PRN, Robbi Gonazles MD, 5 mg at 07/08/22 1149  •  saccharomyces boulardii (FLORASTOR) capsule 250 mg, 250 mg, Oral, BID, Robbi Gonzales MD, 250 mg at 07/11/22 0840  •  sodium chloride 0.9 % flush 10 mL, 10 mL, Intravenous, Q12H, Marisol Blue MD, 10 mL at 07/11/22 0841  •  sodium chloride 0.9 % infusion 40 mL, 40 mL, Intravenous, PRN, Marisol Blue MD  •  valsartan (DIOVAN) tablet 80 mg, 80 mg, Oral, Daily, Robbi Gonzales MD, 80 mg at 07/11/22 0840  •  venlafaxine (EFFEXOR) tablet 50 mg, 50 mg, Oral, Daily, Robbi Gonzales MD, 50 mg at 07/11/22 0840    Assessment & Plan       Calculus of gallbladder with acute cholecystitis and obstruction    Elevated LFTs    Acute pancreatitis    Leukocytosis   -follow up with Dr Bonilla at the end of this week or beginning of next week to set up outpatient lap [facility] [place], [facility]  07/11/22  13:14 EDT

## 2022-07-11 NOTE — DISCHARGE SUMMARY
[facility]         HOSPITALIST  DISCHARGE SUMMARY    Patient Name: Wilma Colmenares    : 1970    MRN: [id number]    Date of Admission: 2022  Date of Discharge:  22  Primary Care Physician: Oumou Dacosta APRN    Consults     Date and Time Order Name Status Description    2022  9:04 AM Inpatient General Surgery Consult      2022  9:04 AM Inpatient Gastroenterology Consult Completed     2022  3:39 AM Inpatient Hospitalist Consult            Final Diagnosis:  Acute pancreatitis  Acute cholecystitis, calculus with obstruction   Elevated LFTs in setting of above  Severe morbid obesity BMI 45  Hyponatremia  UTI  Hematuria, repeat urinalysis as outpatient once infection cleared  Type 2 diabetes mellitus only on metformin as outpatient; A1c 9  Incidental 5.2 cm left adnexal cyst on imaging, outpatient follow-up for monitoring  Hepatic steatosis and hepatomegaly suspect in setting of fatty liver disease  Hypophosphatemia    Hospital Course     Hospital Course:  52-year-old female with history of abdominal pain and cholelithiasis previously evaluated by surgery with plans for outpatient cholecystitis who presents with progressively worsening right upper quadrant abdominal pain over the past week.  She notes she has had issues with this intermittently over the past few months.  Patient found to have acute cholecystitis and acute pancreatitis suspected secondary to gallstones.  MRCP did not reveal choledocholithiasis and ERCP was not required. GI assisted with evaluation.  Surgery consulted for evaluation for cholecystectomy and acute cholecystitis    Patient responded with supportive care, IV fluids, pain control, with improving labs.  Given continued improvement in symptoms, patient remaining afebrile with normal white count cholecystectomy was planned for outpatient.  1 week follow-up with Dr. Bonilla arranged.    Patient discharged in stable condition with close PCP and  general surgery follow up.   Return precautions and follow up discussed and patient voiced agreement and understanding of treatment plan.         CODE STATUS:  Code Status and Medical Interventions:   Ordered at: 07/06/22 0944     Code Status (Patient has no pulse and is not breathing):    CPR (Attempt to Resuscitate)     Medical Interventions (Patient has pulse or is breathing):    Full Support           Day of Discharge     Vital Signs:  Temp:  [97.2 °F (36.2 °C)-97.9 °F (36.6 °C)] 97.9 °F (36.6 °C)  Heart Rate:  [63-69] 69  Resp:  [16-18] 16  BP: (117-139)/(65-74) 139/74    Physical Exam  Gen: awake, resting in bed, conversant  HENT: NCAT, mmm  Resp: CTAB, normal respiratory effort  CV: RRR, no LE pitting edema  GI: Abdomen soft, minimally tender in middle upper quadrant of the abdomen, normal improved from prior, ND, no guarding, +BS  Psych: appropriate mood and affect, aox3  Skin: warm, dry        Discharge Details        Discharge Medications      New Medications      Instructions Start Date   amoxicillin-clavulanate 875-125 MG per tablet  Commonly known as: Augmentin   1 tablet, Oral, 2 Times Daily      HYDROcodone-acetaminophen  MG per tablet  Commonly known as: NORCO   1 tablet, Oral, Every 6 Hours PRN      ondansetron ODT 4 MG disintegrating tablet  Commonly known as: Zofran ODT   4 mg, Translingual, Every 6 Hours PRN      sennosides-docusate 8.6-50 MG per tablet  Commonly known as: PERICOLACE   2 tablets, Oral, 2 Times Daily         Continue These Medications      Instructions Start Date   desvenlafaxine 50 MG 24 hr tablet  Commonly known as: PRISTIQ   50 mg, Oral, Daily      folic acid 1 MG tablet  Commonly known as: FOLVITE   1 mg, Oral, Daily      glipizide 5 MG ER tablet  Commonly known as: GLUCOTROL XL   5 mg, Oral, Daily      LO LOESTRIN FE PO   Oral      metFORMIN 500 MG tablet  Commonly known as: GLUCOPHAGE   500 mg, Oral, 2 Times Daily With Meals      valsartan 80 MG tablet  Commonly known  as: DIOVAN   80 mg, Oral, Daily      vitamin B-12 1000 MCG tablet  Commonly known as: CYANOCOBALAMIN   1,000 mcg, Oral, Daily      vitamin D3 125 MCG (5000 UT) capsule capsule   5,000 Units, Oral, Daily               Discharge Disposition:  Home or Self Care    Diet: advance as tolerated     Discharge Activity: advance as tolerated    No future appointments.    Additional Instructions for the Follow-ups that You Need to Schedule     Discharge Follow-up with PCP   As directed       Currently Documented PCP:    Oumou Dacosta APRN    PCP Phone Number:    [phone]     Follow Up Details: 3-5 days choly and pancreatitis f/u         Discharge Follow-up with Specified Provider: surgery/ya 1 week   As directed      To: surgery/ya 1 week               Pertinent  and/or Most Recent Results       LAB RESULTS:      Lab 07/11/22  0628 07/10/22  0712 07/09/22  0408 07/08/22  0419 07/07/22  0439 07/06/22  1325 07/05/22  1936   WBC 9.51 10.62 12.71* 14.97* 16.95*   < > 11.07*   HEMOGLOBIN 11.7* 12.0 11.8* 12.0 13.0   < > 15.7   HEMATOCRIT 36.5 37.5 36.9 38.9 41.3   < > 47.9*   PLATELETS 230 216 197 200 227   < > 303   NEUTROS ABS 6.72 8.02* 9.58* 11.77* 13.87*   < > 9.89*   IMMATURE GRANS (ABS) 0.06* 0.05 0.07* 0.08* 0.09*   < > 0.03   LYMPHS ABS 1.85 1.58 1.99 1.86 1.69   < > 0.86   MONOS ABS 0.70 0.80 0.90 1.06* 1.22*   < > 0.26   EOS ABS 0.15 0.14 0.12 0.16 0.04   < > 0.00   MCV 88.4 88.7 89.8 90.7 90.6   < > 87.6   LACTATE  --   --   --   --   --   --  1.8    < > = values in this interval not displayed.         Lab 07/11/22  0628 07/10/22  0712 07/09/22  0408 07/08/22  0419 07/07/22  0439 07/06/22  1325   SODIUM 136 134* 136 134* 131* 132*   POTASSIUM 3.6 3.7 4.2 4.5 4.5 4.2   CHLORIDE 100 99 101 102 100 99   CO2 27.6 27.6 26.3 24.3 22.9 22.1   ANION GAP 8.4 7.4 8.7 7.7 8.1 10.9   BUN 6 5* 5* 6 7 9   CREATININE 0.70 0.68 0.73 0.75 0.72 0.73   EGFR 105.5 106.3 100.3 97.1 102.0 100.3   GLUCOSE 130* 145* 127* 141*  184* 235*   CALCIUM 9.1 9.2 8.9 9.1 8.7 9.0   MAGNESIUM  --   --  1.9 1.9 2.0  --    PHOSPHORUS  --   --  2.4* 2.3* 2.5  --    HEMOGLOBIN A1C  --   --   --   --   --  9.00*         Lab 07/11/22  0628 07/10/22  0712 07/09/22  0408 07/08/22  0419 07/07/22  0439 07/06/22  1325 07/05/22  1936   TOTAL PROTEIN 6.3 6.3 6.3 6.2 6.5   < > 8.1   ALBUMIN 3.10* 3.20* 3.30* 3.20* 3.50   < > 4.60   GLOBULIN 3.2 3.1 3.0 3.0 3.0   < > 3.5   ALT (SGPT) 17 20 28 39* 59*   < > 109*   AST (SGOT) 17 15 16 20 38*   < > 131*   BILIRUBIN 0.4 0.6 0.7 1.0 1.5*   < > 2.7*   ALK PHOS 120* 128* 147* 164* 182*   < > 273*   LIPASE  --   --   --   --  171*  --  >3,000*    < > = values in this interval not displayed.                     Brief Urine Lab Results  (Last result in the past 365 days)      Color   Clarity   Blood   Leuk Est   Nitrite   Protein   CREAT   Urine HCG        07/07/22 1531 Dark Yellow   Cloudy   Negative   Moderate (2+)   Negative   100 mg/dL (2+)               Microbiology Results (last 10 days)     Procedure Component Value - Date/Time    Urine Culture - Urine, Urine, Clean Catch [[id number]] Collected: 07/07/22 1531    Lab Status: Final result Specimen: Urine, Clean Catch Updated: 07/08/22 1527     Urine Culture 25,000 CFU/mL Mixed Massiel Isolated    Narrative:      Specimen contains mixed organisms of questionable pathogenicity suggestive of contamination. If symptoms persist, suggest recollection.  Colonization of the urinary tract without infection is common. Treatment is discouraged unless the patient is symptomatic, pregnant, or undergoing an invasive urologic procedure.    COVID PRE-OP / PRE-PROCEDURE SCREENING ORDER (NO ISOLATION) - Swab, Nasopharynx [[id number]]  (Normal) Collected: 07/06/22 1003    Lab Status: Final result Specimen: Swab from Nasopharynx Updated: 07/06/22 1536    Narrative:      The following orders were created for panel order COVID PRE-OP / PRE-PROCEDURE SCREENING ORDER (NO ISOLATION) - Swab,  Nasopharynx.  Procedure                               Abnormality         Status                     ---------                               -----------         ------                     COVID-19,APTIMA PANTHER(...[[id number]]  Normal              Final result                 Please view results for these tests on the individual orders.    COVID-19,APTIMA PANTHER(LADARIUS),[facility], NP/OP SWAB IN UTM/VTM/SALINE TRANSPORT MEDIA,24 HR TAT - Swab, Nasopharynx [[id number]]  (Normal) Collected: 07/06/22 1003    Lab Status: Final result Specimen: Swab from Nasopharynx Updated: 07/06/22 1536     COVID19 Not Detected    Narrative:      Fact sheet for providers: https://www.fda.gov/media/181990/download     Fact sheet for patients: https://www.fda.gov/media/033206/download    Test performed by RT PCR.          RADIOLOGY:    CT Abdomen Pelvis With Contrast    Result Date: 7/6/2022  Impression:   1. CT findings suggest acute to subacute cholecystitis.  No biliary ductal dilatation.  No choledocholithiasis is seen.  No pneumobilia. 2. Age-indeterminate, but probably acute to subacute, duodenitis and/or pancreatitis may be present, probably secondary to the cholecystitis.  3. No pneumoperitoneum or pneumatosis.  No acute appendicitis.  No acute colitis or diverticulitis.  No mechanical bowel obstruction.  4. There is an incidental 5.2 cm left adnexal cyst.  It may represent a normal functional ovarian cyst.  Consider imaging follow-up if clinically warranted.  The patient has undergone bilateral tubal ligation.  5. There is diffuse hepatic steatosis with hepatomegaly.  No splenomegaly.  6. Otherwise, no acute findings are seen.  7. Please see above comments for further detail.  1.   COMMENT:  Part of this note is an electronic transcription of spoken language to printed text. The electronic translation/transcription may permit erroneous, or at times, nonsensical (or even sensical) words or phrases to be inadvertently transcribed  or omitted; this  has reviewed the note for such errors (as well as additional errors); however, some may still exist.  MIRTA SINGH JR, MD       Electronically Signed and Approved By: MIRTA SINGH JR, MD on 7/06/2022 at 3:07              US Gallbladder    Result Date: 7/6/2022  Impression:  Gallstones are seen without definite acute cholecystitis by ultrasound examination.  Please correlate clinically, especially with pertinent lab values.  No biliary ductal dilatation is seen.  A positive sonographic James's sign was reported.  There is diffuse hepatic steatosis with hepatomegaly.  The other findings are as discussed above.     COMMENT:  Part of this note is an electronic transcription of spoken language to printed text. The electronic translation/transcription may permit erroneous, or at times, nonsensical (or even sensical) words or phrases to be inadvertently transcribed or omitted; this  has reviewed the note for such errors (as well as additional errors); however, some may still exist.  MIRTA SINGH JR, MD       Electronically Signed and Approved By: MIRTA SINGH JR, MD on 7/06/2022 at 0:37              US Gallbladder    Result Date: 6/21/2022  Impression:    Multiple gallstones are seen.  Gallbladder wall is difficult to measure due to numerous gallstones shadowing but probably measures about 3 millimeters which is borderline thickened.  There is no pericholecystic fluid and there is a negative sonographic James sign.  Hepatic steatosis.  Exam limited by bowel gas and underpenetration.     TIRSO VAZQUEZ MD       Electronically Signed and Approved By: TIRSO VAZQUEZ MD on 6/21/2022 at 9:03             MRI abdomen wo contrast mrcp    Result Date: 7/6/2022  Impression:    1. Cholelithiasis.  No definite secondary signs to suggest acute cholecystitis. 2. No evidence of dilation of the common duct or filling defect to suggest choledocholithiasis. 3. Inflammatory changes  centered at the pancreas compatible with acute pancreatitis. 4. Patgarima BERNAL MD       Electronically Signed and Approved By: LUBA BERNAL MD on 7/06/2022 at 9:50                           Labs Pending at Discharge:        Time spent on Discharge including face to face service: 38 minutes

## 2022-07-11 NOTE — THERAPY EVALUATION
Acute Care - Physical Therapy Initial Evaluation   [facility]     Patient Name: Wilma Colmenares  : 1970  MRN: [id number]  Today's Date: 2022      Visit Dx:     ICD-10-CM ICD-9-CM   1. Calculus of gallbladder with acute cholecystitis and obstruction  K80.01 574.01   2. Acute biliary pancreatitis, unspecified complication status  K85.10 577.0   3. Lower abdominal pain  R10.30 789.09   4. Difficulty walking  R26.2 719.7     Patient Active Problem List   Diagnosis   • Calculus of gallbladder with acute cholecystitis and obstruction   • Elevated LFTs   • Acute pancreatitis   • Leukocytosis     Past Medical History:   Diagnosis Date   • Arthritis    • Diabetes mellitus (HCC)    • Elevated cholesterol    • Hypertension    • Sleep apnea      Past Surgical History:   Procedure Laterality Date   • HERNIA REPAIR       PT Assessment (last 12 hours)     PT Evaluation and Treatment     Row Name 22 1100          Physical Therapy Time and Intention    Subjective Information no complaints  -AV     Document Type evaluation  -AV     Mode of Treatment individual therapy;physical therapy  -AV     Row Name 22 1100          General Information    Patient Profile Reviewed yes  -AV     Patient Observations alert;cooperative  -AV     Prior Level of Function independent:;all household mobility;gait;transfer;ADL's  Ambulated without an assistive device. No home O2.  -AV     Equipment Currently Used at Home none  -AV     Existing Precautions/Restrictions no known precautions/restrictions  -AV     Row Name 22 1100          Living Environment    Current Living Arrangements home  -AV     Home Accessibility stairs to enter home;stairs within home  -AV     People in Home significant other  -AV     Row Name 22 1100          Home Main Entrance    Number of Stairs, Main Entrance three  -AV     Stair Railings, Main Entrance railing on left side (ascending)  -AV     Row Name 22 1100          Stairs Within Home,  Primary    Stairs, Within Home, Primary Flight to second level where bedroom and bathroom are lcoated.  -AV     Row Name 07/11/22 1100          Range of Motion (ROM)    Range of Motion bilateral lower extremities;ROM is WFL  -AV     [facility] Name 07/11/22 1100          Strength (Manual Muscle Testing)    Strength (Manual Muscle Testing) bilateral lower extremities;strength is WFL  -AV     [facility] Name 07/11/22 1100          Bed Mobility    Bed Mobility bed mobility (all) activities  -AV     All Activities, [place] (Bed Mobility) independent  -AV     Row Name 07/11/22 1100          Transfers    Transfers sit-stand transfer  -AV     Sit-Stand [place] (Transfers) independent  -AV     Row Name 07/11/22 1100          Sit-Stand Transfer    Assistive Device (Sit-Stand Transfers) --  No assistive device  -AV     [facility] Name 07/11/22 1100          Gait/Stairs (Locomotion)    Gait/Stairs Locomotion gait/ambulation independence;gait/ambulation assistive device;distance ambulated  -AV     [place] Level (Gait) independent  -AV     Assistive Device (Gait) --  No assistive device  -AV     Distance in Feet (Gait) 300  -AV     Pattern (Gait) step-through  -AV     [facility] Name 07/11/22 1100          Balance    Balance Assessment standing dynamic balance  -AV     Dynamic Standing Balance independent  -AV     Position/Device Used, Standing Balance unsupported  -AV     [facility] Name 07/11/22 1100          Plan of Care Review    Plan of Care Reviewed With patient  -AV     Progress no change  -AV     Outcome Evaluation Patient not appropriate for skilled PT services at this time as she has not had a decline in functional mobility. Patient transferring and ambulating independently and is safe to continue doing so until her discharge from the hospital. D/C from PT caseload at this time with patient in agreement.  -AV     Row Name 07/11/22 1100          Therapy Assessment/Plan (PT)    Criteria for Skilled Interventions Met (PT) no problems  identified which require skilled intervention  -AV     Row Name 07/11/22 1100          PT Evaluation Complexity    History, PT Evaluation Complexity no personal factors and/or comorbidities  -AV     Examination of Body Systems (PT Eval Complexity) total of 4 or more elements  -AV     Clinical Presentation (PT Evaluation Complexity) stable  -AV     Clinical Decision Making (PT Evaluation Complexity) low complexity  -AV     Overall Complexity (PT Evaluation Complexity) low complexity  -AV     Row Name 07/11/22 1100          Therapy Plan Review/Discharge Plan (PT)    Therapy Plan Review (PT) evaluation/treatment results reviewed;patient  -AV           User Key  (r) = Recorded By, (t) = Taken By, (c) = Cosigned By    Initials Name Provider Type    AV Roger Ryder, PT Physical Therapist                Physical Therapy Education                 Title: PT OT SLP Therapies (In Progress)     Topic: Physical Therapy (In Progress)     Point: Mobility training (Done)     Learning Progress Summary           Patient Acceptance, E,TB, VU by AV at 7/11/2022 1153                   Point: Home exercise program (Not Started)     Learner Progress:  Not documented in this visit.          Point: Body mechanics (Not Started)     Learner Progress:  Not documented in this visit.          Point: Precautions (Not Started)     Learner Progress:  Not documented in this visit.                      User Key     Initials Effective Dates Name Provider Type Discipline    AV 06/11/21 -  Roger Ryder, PT Physical Therapist PT              PT Recommendation and Plan  Anticipated Discharge Disposition (PT): home  Plan of Care Reviewed With: patient  Progress: no change  Outcome Evaluation: Patient not appropriate for skilled PT services at this time as she has not had a decline in functional mobility. Patient transferring and ambulating independently and is safe to continue doing so until her discharge from the hospital. D/C from PT caseload  at this time with patient in agreement.   Outcome Measures     Row Name 07/11/22 1100             How much help from another person do you currently need...    Turning from your back to your side while in flat bed without using bedrails? 4  -AV      Moving from lying on back to sitting on the side of a flat bed without bedrails? 4  -AV      Moving to and from a bed to a chair (including a wheelchair)? 4  -AV      Standing up from a chair using your arms (e.g., wheelchair, bedside chair)? 4  -AV      Climbing 3-5 steps with a railing? 4  -AV      To walk in hospital room? 4  -AV      AM-PAC 6 Clicks Score (PT) 24  -AV              Functional Assessment    Outcome Measure Options AM-PAC 6 Clicks Basic Mobility (PT)  -AV            User Key  (r) = Recorded By, (t) = Taken By, (c) = Cosigned By    Initials Name Provider Type    Roger Perry, PT Physical Therapist                 Time Calculation:    PT Charges     Row Name 07/11/22 1152             Time Calculation    PT Received On 07/11/22  -AV              Untimed Charges    PT Eval/Re-eval Minutes 32  -AV              Total Minutes    Untimed Charges Total Minutes 32  -AV       Total Minutes 32  -AV            User Key  (r) = Recorded By, (t) = Taken By, (c) = Cosigned By    Initials Name Provider Type    Roger Perry, AURY Physical Therapist              Therapy Charges for Today     Code Description Service Date Service Provider Modifiers Qty    [id number] HC PT EVAL LOW COMPLEXITY 3 7/11/2022 Roger Ryder, PT GP 1          PT G-Codes  Outcome Measure Options: AM-PAC 6 Clicks Basic Mobility (PT)  AM-PAC 6 Clicks Score (PT): 24    Roger Ryder, PT  7/11/2022

## 2022-07-12 ENCOUNTER — READMISSION MANAGEMENT (OUTPATIENT)
Dept: CALL CENTER | Facility: HOSPITAL | Age: 52
End: 2022-07-12

## 2022-07-12 NOTE — OUTREACH NOTE
Prep Survey    Flowsheet Row Responses   [facility] facility patient discharged from? [facility]   Is LACE score < 7 ? No   Emergency Room discharge w/ pulse ox? No   Eligibility Not Eligible   What are the reasons patient is not eligible? Other   Does the patient have one of the following disease processes/diagnoses(primary or secondary)? Other   Prep survey completed? Yes          ALVARO HOROWITZ - Registered Nurse

## 2022-07-13 ENCOUNTER — TELEPHONE (OUTPATIENT)
Dept: SURGERY | Facility: CLINIC | Age: 52
End: 2022-07-13

## 2022-07-13 NOTE — TELEPHONE ENCOUNTER
Pt was in the hospital for gallstone induced pancreatitis and was seen by Dr. Bonilla daily. She wants to know if the office consult on Monday is necessary or if she can just get scheduled for surgery as she is miserable.

## 2022-07-15 RX ORDER — NORETHINDRONE ACETATE AND ETHINYL ESTRADIOL, ETHINYL ESTRADIOL AND FERROUS FUMARATE 1MG-10(24)
1 KIT ORAL DAILY
COMMUNITY
Start: 2022-06-18

## 2022-07-15 RX ORDER — SIMVASTATIN 10 MG
TABLET ORAL
COMMUNITY
Start: 2022-06-10 | End: 2022-07-19 | Stop reason: HOSPADM

## 2022-07-15 RX ORDER — BENAZEPRIL HYDROCHLORIDE 20 MG/1
20 TABLET ORAL DAILY
COMMUNITY

## 2022-07-15 RX ORDER — NITROFURANTOIN 25; 75 MG/1; MG/1
CAPSULE ORAL
COMMUNITY
Start: 2022-06-10 | End: 2022-07-18

## 2022-07-15 RX ORDER — PAROXETINE HYDROCHLORIDE 20 MG/1
TABLET, FILM COATED ORAL
COMMUNITY
End: 2022-07-18

## 2022-07-15 RX ORDER — VALACYCLOVIR HYDROCHLORIDE 1 G/1
TABLET, FILM COATED ORAL
COMMUNITY
Start: 2022-05-06 | End: 2022-07-19 | Stop reason: HOSPADM

## 2022-07-15 RX ORDER — SIMVASTATIN 10 MG
TABLET ORAL
COMMUNITY
End: 2022-07-19 | Stop reason: HOSPADM

## 2022-07-15 RX ORDER — DESVENLAFAXINE 50 MG/1
TABLET, EXTENDED RELEASE ORAL
COMMUNITY
End: 2022-07-19 | Stop reason: HOSPADM

## 2022-07-15 RX ORDER — CETIRIZINE HYDROCHLORIDE 10 MG/1
CAPSULE, LIQUID FILLED ORAL
COMMUNITY
End: 2022-07-18

## 2022-07-18 ENCOUNTER — APPOINTMENT (OUTPATIENT)
Dept: NUCLEAR MEDICINE | Facility: HOSPITAL | Age: 52
End: 2022-07-18

## 2022-07-18 ENCOUNTER — APPOINTMENT (OUTPATIENT)
Dept: ULTRASOUND IMAGING | Facility: HOSPITAL | Age: 52
End: 2022-07-18

## 2022-07-18 DIAGNOSIS — R10.11 RUQ PAIN: Primary | ICD-10-CM

## 2022-07-18 RX ORDER — ZINC GLUCONATE 50 MG
1 TABLET ORAL DAILY
COMMUNITY

## 2022-07-18 RX ORDER — MAGNESIUM GLUCONATE 27 MG(500)
27 TABLET ORAL DAILY
COMMUNITY

## 2022-07-19 ENCOUNTER — HOSPITAL ENCOUNTER (OUTPATIENT)
Facility: HOSPITAL | Age: 52
Setting detail: HOSPITAL OUTPATIENT SURGERY
Discharge: HOME | End: 2022-07-19
Attending: SURGERY | Admitting: SURGERY

## 2022-07-19 ENCOUNTER — ANESTHESIA (OUTPATIENT)
Dept: PERIOP | Facility: HOSPITAL | Age: 52
End: 2022-07-19

## 2022-07-19 ENCOUNTER — ANESTHESIA EVENT (OUTPATIENT)
Dept: PERIOP | Facility: HOSPITAL | Age: 52
End: 2022-07-19

## 2022-07-19 VITALS
OXYGEN SATURATION: 94 % | WEIGHT: 268.52 LBS | HEART RATE: 67 BPM | DIASTOLIC BLOOD PRESSURE: 82 MMHG | BODY MASS INDEX: 43.15 KG/M2 | TEMPERATURE: 97.3 F | HEIGHT: 66 IN | RESPIRATION RATE: 16 BRPM | SYSTOLIC BLOOD PRESSURE: 137 MMHG

## 2022-07-19 DIAGNOSIS — R10.11 RUQ PAIN: ICD-10-CM

## 2022-07-19 DIAGNOSIS — K80.01 CALCULUS OF GALLBLADDER WITH ACUTE CHOLECYSTITIS AND OBSTRUCTION: Primary | ICD-10-CM

## 2022-07-19 LAB
GLUCOSE BLDC GLUCOMTR-MCNC: 221 MG/DL (ref 70–99)
GLUCOSE BLDC GLUCOMTR-MCNC: 222 MG/DL (ref 70–99)
QT INTERVAL: 418 MS

## 2022-07-19 PROCEDURE — 47563 LAPARO CHOLECYSTECTOMY/GRAPH: CPT | Performed by: SURGERY

## 2022-07-19 PROCEDURE — 25010000002 DEXAMETHASONE PER 1 MG: Performed by: NURSE ANESTHETIST, CERTIFIED REGISTERED

## 2022-07-19 PROCEDURE — 25010000002 FENTANYL CITRATE (PF) 50 MCG/ML SOLUTION: Performed by: NURSE ANESTHETIST, CERTIFIED REGISTERED

## 2022-07-19 PROCEDURE — C1889 IMPLANT/INSERT DEVICE, NOC: HCPCS | Performed by: SURGERY

## 2022-07-19 PROCEDURE — 25010000002 HYDROMORPHONE 1 MG/ML SOLUTION: Performed by: NURSE ANESTHETIST, CERTIFIED REGISTERED

## 2022-07-19 PROCEDURE — 25010000002 PROPOFOL 10 MG/ML EMULSION: Performed by: NURSE ANESTHETIST, CERTIFIED REGISTERED

## 2022-07-19 PROCEDURE — 88304 TISSUE EXAM BY PATHOLOGIST: CPT | Performed by: SURGERY

## 2022-07-19 PROCEDURE — 25010000002 ONDANSETRON PER 1 MG: Performed by: NURSE ANESTHETIST, CERTIFIED REGISTERED

## 2022-07-19 PROCEDURE — 47563 LAPARO CHOLECYSTECTOMY/GRAPH: CPT | Performed by: SPECIALIST/TECHNOLOGIST, OTHER

## 2022-07-19 PROCEDURE — 93005 ELECTROCARDIOGRAM TRACING: CPT | Performed by: ANESTHESIOLOGY

## 2022-07-19 PROCEDURE — 82962 GLUCOSE BLOOD TEST: CPT

## 2022-07-19 PROCEDURE — 25010000002 MIDAZOLAM PER 1 MG: Performed by: ANESTHESIOLOGY

## 2022-07-19 PROCEDURE — 93010 ELECTROCARDIOGRAM REPORT: CPT | Performed by: INTERNAL MEDICINE

## 2022-07-19 DEVICE — LIGACLIP 10-M/L, 10MM ENDOSCOPIC ROTATING MULTIPLE CLIP APPLIERS
Type: IMPLANTABLE DEVICE | Site: ABDOMEN | Status: FUNCTIONAL
Brand: LIGACLIP

## 2022-07-19 RX ORDER — HYDROCODONE BITARTRATE AND ACETAMINOPHEN 5; 325 MG/1; MG/1
1-2 TABLET ORAL EVERY 4 HOURS PRN
Qty: 25 TABLET | Refills: 0 | Status: SHIPPED | OUTPATIENT
Start: 2022-07-19

## 2022-07-19 RX ORDER — FENTANYL CITRATE 50 UG/ML
INJECTION, SOLUTION INTRAMUSCULAR; INTRAVENOUS AS NEEDED
Status: DISCONTINUED | OUTPATIENT
Start: 2022-07-19 | End: 2022-07-19 | Stop reason: SURG

## 2022-07-19 RX ORDER — BUPIVACAINE HYDROCHLORIDE AND EPINEPHRINE 2.5; 5 MG/ML; UG/ML
INJECTION, SOLUTION EPIDURAL; INFILTRATION; INTRACAUDAL; PERINEURAL AS NEEDED
Status: DISCONTINUED | OUTPATIENT
Start: 2022-07-19 | End: 2022-07-19 | Stop reason: HOSPADM

## 2022-07-19 RX ORDER — SUCCINYLCHOLINE/SOD CL,ISO/PF 100 MG/5ML
SYRINGE (ML) INTRAVENOUS AS NEEDED
Status: DISCONTINUED | OUTPATIENT
Start: 2022-07-19 | End: 2022-07-19 | Stop reason: SURG

## 2022-07-19 RX ORDER — LIDOCAINE HYDROCHLORIDE 20 MG/ML
INJECTION, SOLUTION EPIDURAL; INFILTRATION; INTRACAUDAL; PERINEURAL AS NEEDED
Status: DISCONTINUED | OUTPATIENT
Start: 2022-07-19 | End: 2022-07-19 | Stop reason: SURG

## 2022-07-19 RX ORDER — ONDANSETRON 2 MG/ML
4 INJECTION INTRAMUSCULAR; INTRAVENOUS ONCE AS NEEDED
Status: DISCONTINUED | OUTPATIENT
Start: 2022-07-19 | End: 2022-07-19 | Stop reason: HOSPADM

## 2022-07-19 RX ORDER — ACETAMINOPHEN 500 MG
1000 TABLET ORAL ONCE
Status: COMPLETED | OUTPATIENT
Start: 2022-07-19 | End: 2022-07-19

## 2022-07-19 RX ORDER — OXYCODONE HYDROCHLORIDE 5 MG/1
5 TABLET ORAL
Status: DISCONTINUED | OUTPATIENT
Start: 2022-07-19 | End: 2022-07-19 | Stop reason: HOSPADM

## 2022-07-19 RX ORDER — SODIUM CHLORIDE, SODIUM LACTATE, POTASSIUM CHLORIDE, CALCIUM CHLORIDE 600; 310; 30; 20 MG/100ML; MG/100ML; MG/100ML; MG/100ML
9 INJECTION, SOLUTION INTRAVENOUS CONTINUOUS PRN
Status: DISCONTINUED | OUTPATIENT
Start: 2022-07-19 | End: 2022-07-19 | Stop reason: HOSPADM

## 2022-07-19 RX ORDER — GLYCOPYRROLATE 0.2 MG/ML
0.2 INJECTION INTRAMUSCULAR; INTRAVENOUS
Status: COMPLETED | OUTPATIENT
Start: 2022-07-19 | End: 2022-07-19

## 2022-07-19 RX ORDER — INDOCYANINE GREEN AND WATER 25 MG
2.5 KIT INJECTION ONCE
Status: COMPLETED | OUTPATIENT
Start: 2022-07-19 | End: 2022-07-19

## 2022-07-19 RX ORDER — ONDANSETRON 4 MG/1
4 TABLET, FILM COATED ORAL ONCE AS NEEDED
Status: DISCONTINUED | OUTPATIENT
Start: 2022-07-19 | End: 2022-07-19 | Stop reason: HOSPADM

## 2022-07-19 RX ORDER — SCOLOPAMINE TRANSDERMAL SYSTEM 1 MG/1
1 PATCH, EXTENDED RELEASE TRANSDERMAL ONCE
Status: DISCONTINUED | OUTPATIENT
Start: 2022-07-19 | End: 2022-07-19 | Stop reason: HOSPADM

## 2022-07-19 RX ORDER — PROPOFOL 10 MG/ML
VIAL (ML) INTRAVENOUS AS NEEDED
Status: DISCONTINUED | OUTPATIENT
Start: 2022-07-19 | End: 2022-07-19 | Stop reason: SURG

## 2022-07-19 RX ORDER — MAGNESIUM HYDROXIDE 1200 MG/15ML
LIQUID ORAL AS NEEDED
Status: DISCONTINUED | OUTPATIENT
Start: 2022-07-19 | End: 2022-07-19 | Stop reason: HOSPADM

## 2022-07-19 RX ORDER — ROCURONIUM BROMIDE 10 MG/ML
INJECTION, SOLUTION INTRAVENOUS AS NEEDED
Status: DISCONTINUED | OUTPATIENT
Start: 2022-07-19 | End: 2022-07-19 | Stop reason: SURG

## 2022-07-19 RX ORDER — DEXMEDETOMIDINE HYDROCHLORIDE 100 UG/ML
INJECTION, SOLUTION INTRAVENOUS AS NEEDED
Status: DISCONTINUED | OUTPATIENT
Start: 2022-07-19 | End: 2022-07-19 | Stop reason: SURG

## 2022-07-19 RX ORDER — DEXAMETHASONE SODIUM PHOSPHATE 4 MG/ML
INJECTION, SOLUTION INTRA-ARTICULAR; INTRALESIONAL; INTRAMUSCULAR; INTRAVENOUS; SOFT TISSUE AS NEEDED
Status: DISCONTINUED | OUTPATIENT
Start: 2022-07-19 | End: 2022-07-19 | Stop reason: SURG

## 2022-07-19 RX ORDER — MIDAZOLAM HYDROCHLORIDE 1 MG/ML
2 INJECTION INTRAMUSCULAR; INTRAVENOUS ONCE
Status: COMPLETED | OUTPATIENT
Start: 2022-07-19 | End: 2022-07-19

## 2022-07-19 RX ORDER — CLINDAMYCIN PHOSPHATE 900 MG/50ML
900 INJECTION INTRAVENOUS EVERY 8 HOURS SCHEDULED
Status: DISCONTINUED | OUTPATIENT
Start: 2022-07-19 | End: 2022-07-19 | Stop reason: HOSPADM

## 2022-07-19 RX ADMIN — FENTANYL CITRATE 50 MCG: 50 INJECTION, SOLUTION INTRAMUSCULAR; INTRAVENOUS at 08:56

## 2022-07-19 RX ADMIN — MIDAZOLAM HYDROCHLORIDE 2 MG: 1 INJECTION, SOLUTION INTRAMUSCULAR; INTRAVENOUS at 08:37

## 2022-07-19 RX ADMIN — LIDOCAINE HYDROCHLORIDE 100 MG: 20 INJECTION, SOLUTION EPIDURAL; INFILTRATION; INTRACAUDAL; PERINEURAL at 08:56

## 2022-07-19 RX ADMIN — INDOCYANINE GREEN AND WATER 25 MG: KIT at 08:17

## 2022-07-19 RX ADMIN — SCOPALAMINE 1 PATCH: 1 PATCH, EXTENDED RELEASE TRANSDERMAL at 08:16

## 2022-07-19 RX ADMIN — FENTANYL CITRATE 50 MCG: 50 INJECTION, SOLUTION INTRAMUSCULAR; INTRAVENOUS at 08:52

## 2022-07-19 RX ADMIN — DEXMEDETOMIDINE HYDROCHLORIDE 10 MCG: 100 INJECTION, SOLUTION, CONCENTRATE INTRAVENOUS at 09:03

## 2022-07-19 RX ADMIN — GLYCOPYRROLATE 0.2 MG: 0.2 INJECTION INTRAMUSCULAR; INTRAVENOUS at 08:37

## 2022-07-19 RX ADMIN — OXYCODONE HYDROCHLORIDE 5 MG: 5 TABLET ORAL at 10:09

## 2022-07-19 RX ADMIN — HYDROMORPHONE HYDROCHLORIDE 0.5 MG: 1 INJECTION, SOLUTION INTRAMUSCULAR; INTRAVENOUS; SUBCUTANEOUS at 10:06

## 2022-07-19 RX ADMIN — SODIUM CHLORIDE, POTASSIUM CHLORIDE, SODIUM LACTATE AND CALCIUM CHLORIDE 9 ML/HR: 600; 310; 30; 20 INJECTION, SOLUTION INTRAVENOUS at 08:16

## 2022-07-19 RX ADMIN — ACETAMINOPHEN 1000 MG: 500 TABLET ORAL at 08:17

## 2022-07-19 RX ADMIN — PROPOFOL 260 MG: 10 INJECTION, EMULSION INTRAVENOUS at 08:56

## 2022-07-19 RX ADMIN — DEXAMETHASONE SODIUM PHOSPHATE 4 MG: 4 INJECTION, SOLUTION INTRA-ARTICULAR; INTRALESIONAL; INTRAMUSCULAR; INTRAVENOUS; SOFT TISSUE at 08:56

## 2022-07-19 RX ADMIN — DEXMEDETOMIDINE HYDROCHLORIDE 10 MCG: 100 INJECTION, SOLUTION, CONCENTRATE INTRAVENOUS at 09:25

## 2022-07-19 RX ADMIN — CLINDAMYCIN IN 5 PERCENT DEXTROSE 900 MG: 18 INJECTION, SOLUTION INTRAVENOUS at 08:49

## 2022-07-19 RX ADMIN — ONDANSETRON 4 MG: 2 INJECTION INTRAMUSCULAR; INTRAVENOUS at 09:27

## 2022-07-19 RX ADMIN — SUGAMMADEX 300 MG: 100 INJECTION, SOLUTION INTRAVENOUS at 09:37

## 2022-07-19 RX ADMIN — ROCURONIUM BROMIDE 50 MG: 10 INJECTION INTRAVENOUS at 09:05

## 2022-07-19 RX ADMIN — Medication 140 MG: at 08:56

## 2022-07-19 NOTE — ANESTHESIA POSTPROCEDURE EVALUATION
Patient: Wilma Colmenares    Procedure Summary     Date: 07/19/22 Room / Location: [facility] OR  / [facility]    Anesthesia Start: 0849 Anesthesia Stop: 0956    Procedure: CHOLECYSTECTOMY LAPAROSCOPIC (N/A Abdomen) Diagnosis:       RUQ pain      (RUQ pain [R10.11])    Surgeons: Lian Lane MD Provider: Gina Caraballo MD    Anesthesia Type: general ASA Status: 3          Anesthesia Type: general    Vitals  Vitals Value Taken Time   /85 07/19/22 1030   Temp 36.2 °C (97.2 °F) 07/19/22 0953   Pulse 75 07/19/22 1031   Resp 12 07/19/22 1009   SpO2 95 % 07/19/22 1031   Vitals shown include unvalidated device data.        Post Anesthesia Care and Evaluation    Patient location during evaluation: bedside  Patient participation: complete - patient participated  Level of consciousness: awake  Pain score: 0  Pain management: adequate    Airway patency: patent  Anesthetic complications: No anesthetic complications  PONV Status: none  Cardiovascular status: acceptable and stable  Respiratory status: acceptable and room air  Hydration status: acceptable    Comments: An Anesthesiologist personally participated in the most demanding procedures (including induction and emergence if applicable) in the anesthesia plan, monitored the course of anesthesia administration at frequent intervals and remained physically present and available for immediate diagnosis and treatment of emergencies.

## 2022-07-19 NOTE — ANESTHESIA PREPROCEDURE EVALUATION
Anesthesia Evaluation     Patient summary reviewed and Nursing notes reviewed   history of anesthetic complications: PONV  NPO Solid Status: > 8 hours  NPO Liquid Status: > 2 hours           Airway   Mallampati: III  TM distance: >3 FB  Neck ROM: full  No difficulty expected and Large neck circumference  Dental      Pulmonary - normal exam    breath sounds clear to auscultation  (+) sleep apnea,   Cardiovascular - normal exam  Exercise tolerance: good (4-7 METS)    ECG reviewed  Rhythm: regular  Rate: normal    (+) hypertension, hyperlipidemia,       Neuro/Psych- negative ROS  GI/Hepatic/Renal/Endo    (+)   diabetes mellitus,     Musculoskeletal     Abdominal    Substance History - negative use     OB/GYN negative ob/gyn ROS         Other   arthritis,                      Anesthesia Plan    ASA 3     general     (Patient understands anesthesia not responsible for dental damage.)  intravenous induction     Anesthetic plan, risks, benefits, and alternatives have been provided, discussed and informed consent has been obtained with: patient.  Use of blood products discussed with patient .   Plan discussed with CRNA.        CODE STATUS:

## 2022-07-19 NOTE — INTERVAL H&P NOTE
H&P updated. The patient was examined and the following changes are noted:  Risks and benefits discussed with patient and allergies reviewed.        Patient/surrogate refused vaccine... none...

## 2022-07-19 NOTE — DISCHARGE INSTRUCTIONS
DISCHARGE INSTRUCTIONS LAPAROSCOPIC CHOLECYSTECTOMY/APPENDECTOMY  (GALL BLADDER)      For your surgery you had:  General anesthesia (you may have a sore throat for the first 24 hours)  IV sedation  Local anesthesia  Monitored anesthesia care  You received a medicated patch for nausea prevention today (behind your ear). It is recommended that you remove it 24-48 hours post-operatively. It must be removed within 72 hours.  You received an anesthesia medication today that can cause hormonal forms of birth control to be ineffective. You should use a different form of birth control (to prevent pregnancy) for 7 days.   You may experience dizziness, drowsiness, or light-headedness for several hours following surgery.  Do not stay alone tonight.  Limit your activity for 24 hours.  Resume your diet slowly.  Follow whatever special dietary instructions you may have been given by your doctor.  You should not drive, operate machinery, drink alcohol, or sign legally binding documents for 24 hours or while you are taking pain medication.  Last dose of pain medication was given at:  Oxycodone at 1010am  .    NOTIFY YOUR DOCTOR IF YOU EXPERIENCE ANY OF THE FOLLOWING:  Temperature greater than 101 degrees Fahrenheit  Shaking Chills  Redness or excessive drainage from incision  Nausea, vomiting and/or pain that is not controlled by prescribed medications  Increase in bleeding or bleeding that is excessive  Unable to urinate in 6 hours after surgery  If unable to reach your doctor, please go to the closest Emergency Room You may remove dressings in 48hrs    .  You may shower   in 48hrs  .  Apply an ice pack 24-48 hours.  You may experience gas discomfort 24-48 hours after discharge, especially in chest and shoulders.  Changing position frequently may alleviate this discomfort.  If you have excessive pain, swelling, redness, drainage or other problems, notify your physician.  If unable to urinate in 6 to 8 hours after surgery or  urinating frequently in small amounts, notify your doctor or go to the nearest Emergency Room.  Medications per physician instructions as indicated on Discharge Medication Information Sheet.  You should see   for follow-up care  on  .  Phone number:      SPECIAL INSTRUCTIONS:                        I have read and received the above instructions.     Patient/Responsible Party's Signature Date/Time     RN Signature Date/Time

## 2022-07-19 NOTE — OP NOTE
CHOLECYSTECTOMY LAPAROSCOPIC  Procedure Report    Patient Name:  Wilma Colmenares  YOB: 1970    Date of Surgery:  7/19/2022     Indications:  Biliary colic    Pre-op Diagnosis:   RUQ pain [R10.11]       Post-Op Diagnosis Codes:     * RUQ pain [R10.11]    Procedure/CPT® Codes:  Procedure(s):  CHOLECYSTECTOMY LAPAROSCOPIC    Staff:  Surgeon(s):  Lian Lane MD    Assistant: Lilibeth Humphrey CSA    Anesthesia: General    Estimated Blood Loss: 5 mL    Implants:    Implant Name Type Inv. Item Serial No.  Lot No. LRB No. Used Action   CLIPAPPLR M/ ENDO LIGACLIP ROT 10MM MD/LG - [id number] Implant CLIPAPPLR M/ ENDO LIGACLIP ROT 10MM MD/LG  [employer]  DIV OF J AND J [id number] N/A 1 Implanted       Specimen:          Specimens     ID Source Type Tests Collected By Collected At Frozen?    A Gallbladder Tissue · TISSUE PATHOLOGY EXAM   Lian Lane MD 7/19/22 0832 No    Description: Gallbladder and contents    This specimen was not marked as sent.              Findings: none    Complications: none    Description of Procedure:   After informed consent was obtained the patient was taken to the operating room placed supine on the table. The patient was prepped and draped in normal sterile fashion. A veress needle was inserted into the left upper quadrant in standard fashion and the abdomen was insufflated without complication. We began by inserting a 5 mm periumbilical optiview trocar under direct visualization. The abdomen was insufflated, and an epigastric 10/12 mm port as well as 2 RUQ 5 mm ports were placed under direct visualization. The gallbladder was grasped and retracted cephalad and the omental attachments were bluntly dissected off. The infundibulum was grasped and retracted laterally. At this point in time I used [place] dissector to dissect out the cystic artery and cystic duct. The critical view was obtained and we were able to see the liver between the cystic  duct and cystic artery. IC Green and SPY technology were used to evaluate the cystic and common bile ducts. Both the cystic duct and cystic artery were both clipped twice proximally and once distally. They were ligated leaving two clips on both the cystic duct and cystic artery stumps. The gallbladder was then grasped and retracted upwards and removed from the gallbladder fossa using electrocautery. It was placed into a bag and removed from the epigastric port. The trocars were reinserted and the gallbladder fossa was inspected and the bleeding gallbladder fossa was controlled using electrocautery for hemostasis. The 10/12 mm port site was closed with mersilene and then the ports removed under direct visualization and the abdomen was desufflated. The skin sites were closed using subcuticular Vicryl stitches and local anesthetic was injected. The patient was awakened and taken to the recovery room in good condition. All counts were correct at the end of the case  Assistant: Lilibeth Humphrey CSA  was responsible for performing the following activities: Retraction, Closing and Held/Positioned Camera and their skilled assistance was necessary for the success of this case.    Lian Lane MD     Date: 7/19/2022  Time: 10:01 EDT

## 2022-07-20 LAB
CYTO UR: NORMAL
LAB AP CASE REPORT: NORMAL
LAB AP CLINICAL INFORMATION: NORMAL
PATH REPORT.FINAL DX SPEC: NORMAL
PATH REPORT.GROSS SPEC: NORMAL

## 2022-08-04 ENCOUNTER — OFFICE VISIT (OUTPATIENT)
Dept: SURGERY | Facility: CLINIC | Age: 52
End: 2022-08-04

## 2022-08-04 VITALS — WEIGHT: 265 LBS | RESPIRATION RATE: 16 BRPM | OXYGEN SATURATION: 96 % | BODY MASS INDEX: 42.59 KG/M2 | HEIGHT: 66 IN

## 2022-08-04 DIAGNOSIS — Z90.49 STATUS POST LAPAROSCOPIC CHOLECYSTECTOMY: Primary | ICD-10-CM

## 2022-08-04 PROCEDURE — 99024 POSTOP FOLLOW-UP VISIT: CPT | Performed by: NURSE PRACTITIONER

## 2022-08-04 NOTE — PROGRESS NOTES
Chief Complaint: Cholelithiasis and Post-op    Subjective      Follow-up visit       History of Present Illness  Wilma Colmenares is a 52 y.o. female presents to [facility] for follow-up visit.    Patient presents today for follow-up visit after undergoing a laparoscopic cholecystectomy on 7/19/2022 performed by Dr. Lian Lane.    Patient was with chronic cholecystitis, cholelithiasis & cholesterolosis per pathology.    Pathology:  Clinical Information    RUQ pain   Final Diagnosis   Gallbladder, cholecystectomy:                - Chronic cholecystitis                - Cholesterolosis                - Cholelithiasis    Electronically signed by Whitley Hurley DO      Patient reports that she is , but overall feels much better.    Admits to tolerating her diet well with no nausea.  Having bowel movements without difficulty.    Objective     Past Medical History:   Diagnosis Date   • Arthritis    • Cholelithiasis    • Diabetes mellitus (HCC)    • Elevated cholesterol    • Hypertension    • PONV (postoperative nausea and vomiting)    • Sleep apnea        Past Surgical History:   Procedure Laterality Date   • CHOLECYSTECTOMY N/A 7/19/2022    Procedure: CHOLECYSTECTOMY LAPAROSCOPIC;  Surgeon: Lian Lane MD;  [place] [facility] [place] [facility] [place]  Service: General;  Laterality: N/A;   • HERNIA REPAIR         Outpatient Medications Marked as Taking for the 8/4/22 encounter (Office Visit) with Leslee Morrison, APRJORI   Medication Sig Dispense Refill   • benazepril (LOTENSIN) 20 MG tablet Take 20 mg by mouth Daily.     • desvenlafaxine (PRISTIQ) 50 MG 24 hr tablet Take 50 mg by mouth Daily.     • folic acid (FOLVITE) 1 MG tablet Take 1 mg by mouth Daily.     • glipizide (GLUCOTROL XL) 5 MG ER tablet Take 5 mg by mouth Daily.     • HYDROcodone-acetaminophen (NORCO)  MG per tablet Take 1 tablet by mouth Every 6 (Six) Hours As Needed for Severe Pain  for up to 12 doses. 12  "tablet 0   • HYDROcodone-acetaminophen (NORCO) 5-325 MG per tablet Take 1-2 tablets by mouth Every 4 (Four) Hours As Needed (Pain). 25 tablet 0   • Lo Loestrin Fe 1 MG-10 MCG / 10 MCG tablet Take 1 tablet by mouth Daily.     • magnesium gluconate (MAGONATE) 500 MG tablet Take 27 mg by mouth Daily.     • metFORMIN (GLUCOPHAGE) 1000 MG tablet      • Norethin-Eth Estrad-Fe Biphas (LO LOESTRIN FE PO) Take  by mouth.     • ondansetron ODT (Zofran ODT) 4 MG disintegrating tablet Place 1 tablet on the tongue Every 6 (Six) Hours As Needed for Nausea or Vomiting. 16 tablet 0   • vitamin B-12 (CYANOCOBALAMIN) 1000 MCG tablet Take 1,000 mcg by mouth Daily.     • vitamin D3 125 MCG (5000 UT) capsule capsule Take 5,000 Units by mouth Daily.     • Zinc 50 MG tablet Take 1 tablet by mouth Daily.         Allergies   Allergen Reactions   • Demerol [Meperidine] Nausea And Vomiting        Family History   Problem Relation Age of Onset   • Malig Hyperthermia Neg Hx        Social History     Socioeconomic History   • Marital status:    Tobacco Use   • Smoking status: Never Smoker   • Smokeless tobacco: Never Used   Vaping Use   • Vaping Use: Never used   Substance and Sexual Activity   • Alcohol use: Yes     Comment: once every three months   • Drug use: Never   • Sexual activity: Defer       Review of Systems   Constitutional: Negative for chills and fever.   Gastrointestinal: Negative for abdominal distention, abdominal pain, anal bleeding, blood in stool, constipation, diarrhea and rectal pain.        Vital Signs:   Resp 16   Ht 167.6 cm (65.98\")   Wt 120 kg (265 lb)   SpO2 96%   BMI 42.79 kg/m²      Physical Exam  Vitals and nursing note reviewed.   Constitutional:       General: She is not in acute distress.     Appearance: She is obese.   HENT:      Head: Normocephalic.   Cardiovascular:      Rate and Rhythm: Normal rate.   Pulmonary:      Effort: Pulmonary effort is normal.      Breath sounds: No stridor. No wheezing. "   Abdominal:      Palpations: Abdomen is soft.      Tenderness: There is no guarding.      Comments: All laparoscopic incisions are clean dry and intact without erythema.    Lateral incision is with suture coming through the incision.  I cut that flush with the skin today   Musculoskeletal:         General: No deformity. Normal range of motion.   Skin:     General: Skin is warm and dry.      Coloration: Skin is not jaundiced.   Neurological:      General: No focal deficit present.      Mental Status: She is alert and oriented to person, place, and time.   Psychiatric:         Mood and Affect: Mood normal.         Thought Content: Thought content normal.          Result Review :          []  Laboratory  []  Radiology  [x]  Pathology  []  Microbiology  []  EKG/Telemetry   []  Cardiology/Vascular   [x]  Old records  Today I reviewed Dr. Lane's operative report and pathology     Assessment and Plan    Diagnoses and all orders for this visit:    1. Status post laparoscopic cholecystectomy (Primary)        Follow Up   Return if symptoms worsen or fail to improve.     Seek medical emergency services:  Fever greater than 101 associated with chills.  Extreme pain.    Patient was given instructions and counseling regarding her condition or for health maintenance advice. Please see specific information pulled into the AVS if appropriate.

## 2022-08-09 ENCOUNTER — TELEPHONE (OUTPATIENT)
Dept: SURGERY | Facility: CLINIC | Age: 52
End: 2022-08-09

## 2022-08-09 NOTE — TELEPHONE ENCOUNTER
I have called and spoke with duyen and I told her patient is PRN at this office . No further work-up is needed.

## 2022-08-09 NOTE — TELEPHONE ENCOUNTER
Carlos, Nurse , called from [facility].  She is asking for office notes from patient's visit on 08/04/22 if f/u with surgery with Dr. Lane on 07/19/22.  She asked if any tests or any f/u appointments are required for patient.    She may be called at [phone] x [phone].  Fax note to [phone].    April, should this come to you or Dr. Lane?  Her f/u was with you.

## 2023-03-20 ENCOUNTER — TRANSCRIBE ORDERS (OUTPATIENT)
Dept: ADMINISTRATIVE | Facility: HOSPITAL | Age: 53
End: 2023-03-20
Payer: COMMERCIAL

## 2023-03-20 DIAGNOSIS — Z12.31 SCREENING MAMMOGRAM FOR BREAST CANCER: Primary | ICD-10-CM

## 2023-03-20 DIAGNOSIS — Z78.0 POST-MENOPAUSAL: ICD-10-CM

## 2023-06-13 ENCOUNTER — HOSPITAL ENCOUNTER (OUTPATIENT)
Dept: BONE DENSITY | Facility: HOSPITAL | Age: 53
Discharge: HOME | End: 2023-06-13
Payer: COMMERCIAL

## 2023-06-13 ENCOUNTER — HOSPITAL ENCOUNTER (OUTPATIENT)
Dept: MAMMOGRAPHY | Facility: HOSPITAL | Age: 53
Discharge: HOME | End: 2023-06-13
Payer: COMMERCIAL

## 2023-06-13 DIAGNOSIS — Z12.31 SCREENING MAMMOGRAM FOR BREAST CANCER: ICD-10-CM

## 2023-06-13 DIAGNOSIS — Z78.0 POST-MENOPAUSAL: ICD-10-CM

## 2023-06-13 PROCEDURE — 77080 DXA BONE DENSITY AXIAL: CPT

## 2023-06-13 PROCEDURE — 77063 BREAST TOMOSYNTHESIS BI: CPT

## 2023-06-13 PROCEDURE — 77067 SCR MAMMO BI INCL CAD: CPT

## 2023-12-29 ENCOUNTER — TELEPHONE (OUTPATIENT)
Dept: GASTROENTEROLOGY | Facility: CLINIC | Age: 53
End: 2023-12-29
Payer: COMMERCIAL

## 2023-12-29 NOTE — TELEPHONE ENCOUNTER
"Contacted the patient and verified her information then advised that I was calling in regards to the referral we received from [facility] for \"Positive colorectal cancer screening using Cologuard test\". Patient verbalized understanding and has been scheduled for the next available DA screening call.   " mami all pertinent systems normal

## 2024-01-04 ENCOUNTER — TELEPHONE (OUTPATIENT)
Dept: GASTROENTEROLOGY | Facility: CLINIC | Age: 54
End: 2024-01-04
Payer: COMMERCIAL

## 2024-01-04 ENCOUNTER — CLINICAL SUPPORT (OUTPATIENT)
Dept: GASTROENTEROLOGY | Facility: CLINIC | Age: 54
End: 2024-01-04
Payer: COMMERCIAL

## 2024-01-04 ENCOUNTER — PREP FOR SURGERY (OUTPATIENT)
Dept: OTHER | Facility: HOSPITAL | Age: 54
End: 2024-01-04
Payer: COMMERCIAL

## 2024-01-04 DIAGNOSIS — R19.5 POSITIVE COLORECTAL CANCER SCREENING USING COLOGUARD TEST: Primary | ICD-10-CM

## 2024-01-04 RX ORDER — SEMAGLUTIDE 2.68 MG/ML
INJECTION, SOLUTION SUBCUTANEOUS
COMMUNITY
Start: 2023-12-26

## 2024-01-04 RX ORDER — PRAVASTATIN SODIUM 10 MG
TABLET ORAL
COMMUNITY
Start: 2023-10-24

## 2024-01-04 RX ORDER — SODIUM, POTASSIUM,MAG SULFATES 17.5-3.13G
1 SOLUTION, RECONSTITUTED, ORAL ORAL EVERY 12 HOURS
Qty: 354 ML | Refills: 0 | Status: SHIPPED | OUTPATIENT
Start: 2024-01-04

## 2024-01-04 RX ORDER — VALSARTAN 40 MG/1
40 TABLET ORAL DAILY
COMMUNITY

## 2024-01-04 NOTE — PROGRESS NOTES
Wilma Colmenares  1970  53 y.o.    Reason for call: Positive Cologuard  Prep prescribed: Suprep  Prep instructions reviewed with patient and sent to patient via [facility]  Is the patient currently on any injectable medications for weight loss or diabetes? Yes- Ozempic pt is aware to hold for 7 days prior   Clearance needed? No  If yes, what clearance is needed? N/A  Clearance has been requested from N/A   The patient has been scheduled for: Colonoscopy  After your procedure, you will be contacted with results. Please confirm the best phone # to reach the patient: [phone]  Family history of colon cancer? Yes  If yes, indicate relative: Maternal aunt   Family History   Problem Relation Age of Onset    Heart attack Father     Colon cancer Maternal Aunt     Malig Hyperthermia Neg Hx      Past Medical History:   Diagnosis Date    Arthritis     Cholelithiasis     Diabetes mellitus     Elevated cholesterol     Hypertension     PONV (postoperative nausea and vomiting)     Sleep apnea      Allergies   Allergen Reactions    Demerol [Meperidine] Nausea And Vomiting     Past Surgical History:   Procedure Laterality Date    CHOLECYSTECTOMY N/A 7/19/2022    Procedure: CHOLECYSTECTOMY LAPAROSCOPIC;  Surgeon: Lian Lane MD;  [place] [facility] [place] [facility] [place]  Service: General;  Laterality: N/A;    HERNIA REPAIR       Social History     Socioeconomic History    Marital status:    Tobacco Use    Smoking status: Never    Smokeless tobacco: Never   Vaping Use    Vaping Use: Never used   Substance and Sexual Activity    Alcohol use: Yes     Comment: once every three months    Drug use: Never    Sexual activity: Defer       Current Outpatient Medications:     desvenlafaxine (PRISTIQ) 50 MG 24 hr tablet, Take 1 tablet by mouth Daily., Disp: , Rfl:     folic acid (FOLVITE) 1 MG tablet, Take 1 tablet by mouth Daily., Disp: , Rfl:     Lo Loestrin Fe 1 MG-10 MCG / 10 MCG tablet, Take 1 tablet by mouth Daily., Disp: , Rfl:      metFORMIN (GLUCOPHAGE) 1000 MG tablet, , Disp: , Rfl:     Ozempic, 2 MG/DOSE, 8 MG/3ML solution pen-injector, INJECT 2 MG UNDER THE SKIN EVERY WEEK, Disp: , Rfl:     pravastatin (PRAVACHOL) 10 MG tablet, , Disp: , Rfl:     valsartan (DIOVAN) 40 MG tablet, Take 1 tablet by mouth Daily., Disp: , Rfl:     vitamin B-12 (CYANOCOBALAMIN) 1000 MCG tablet, Take 1 tablet by mouth Daily., Disp: , Rfl:     vitamin D3 125 MCG (5000 UT) capsule capsule, Take 1 capsule by mouth Daily., Disp: , Rfl:     Zinc 50 MG tablet, Take 1 tablet by mouth Daily., Disp: , Rfl:

## 2024-01-16 ENCOUNTER — TELEPHONE (OUTPATIENT)
Dept: GASTROENTEROLOGY | Facility: CLINIC | Age: 54
End: 2024-01-16
Payer: COMMERCIAL

## 2024-02-02 ENCOUNTER — TELEPHONE (OUTPATIENT)
Dept: GASTROENTEROLOGY | Facility: CLINIC | Age: 54
End: 2024-02-02
Payer: COMMERCIAL

## 2024-02-02 NOTE — TELEPHONE ENCOUNTER
Hub staff attempted to follow warm transfer process and was unsuccessful     Caller: Wilma Colmenares    Relationship to patient: Self    Best call back number: [phone]     Patient is needing: PT DIAGNOSED WITH COVID TODAY (1/28/24). SHE NEEDS TO KNOW IF THIS WILL INTERFERE WITH HER UPCOMING PROCEDURE (2/12/24). PLEASE CALL TO ADVISE.

## 2024-02-02 NOTE — TELEPHONE ENCOUNTER
Spoke with pt and advised, I advised CDC guidelines and that if she is feeling better the day prior to her procedure she can proceed as long as she is no longer exhibiting any symptoms. Pt states understanding

## 2024-02-09 ENCOUNTER — ANESTHESIA EVENT (OUTPATIENT)
Dept: GASTROENTEROLOGY | Facility: HOSPITAL | Age: 54
End: 2024-02-09
Payer: COMMERCIAL

## 2024-02-12 ENCOUNTER — HOSPITAL ENCOUNTER (OUTPATIENT)
Facility: HOSPITAL | Age: 54
Setting detail: HOSPITAL OUTPATIENT SURGERY
Discharge: HOME | End: 2024-02-12
Attending: INTERNAL MEDICINE | Admitting: INTERNAL MEDICINE
Payer: COMMERCIAL

## 2024-02-12 ENCOUNTER — ANESTHESIA (OUTPATIENT)
Dept: GASTROENTEROLOGY | Facility: HOSPITAL | Age: 54
End: 2024-02-12
Payer: COMMERCIAL

## 2024-02-12 VITALS
TEMPERATURE: 98.6 F | HEART RATE: 77 BPM | SYSTOLIC BLOOD PRESSURE: 130 MMHG | WEIGHT: 269.84 LBS | RESPIRATION RATE: 14 BRPM | BODY MASS INDEX: 40.9 KG/M2 | HEIGHT: 68 IN | OXYGEN SATURATION: 98 % | DIASTOLIC BLOOD PRESSURE: 81 MMHG

## 2024-02-12 DIAGNOSIS — R19.5 POSITIVE COLORECTAL CANCER SCREENING USING COLOGUARD TEST: ICD-10-CM

## 2024-02-12 LAB — GLUCOSE BLDC GLUCOMTR-MCNC: 133 MG/DL (ref 70–99)

## 2024-02-12 PROCEDURE — 88305 TISSUE EXAM BY PATHOLOGIST: CPT | Performed by: INTERNAL MEDICINE

## 2024-02-12 PROCEDURE — 25810000003 LACTATED RINGERS PER 1000 ML

## 2024-02-12 PROCEDURE — 25010000002 PROPOFOL 10 MG/ML EMULSION: Performed by: NURSE ANESTHETIST, CERTIFIED REGISTERED

## 2024-02-12 PROCEDURE — 25010000002 ONDANSETRON PER 1 MG

## 2024-02-12 PROCEDURE — 82948 REAGENT STRIP/BLOOD GLUCOSE: CPT

## 2024-02-12 RX ORDER — LIDOCAINE HYDROCHLORIDE 20 MG/ML
INJECTION, SOLUTION EPIDURAL; INFILTRATION; INTRACAUDAL; PERINEURAL AS NEEDED
Status: DISCONTINUED | OUTPATIENT
Start: 2024-02-12 | End: 2024-02-12 | Stop reason: SURG

## 2024-02-12 RX ORDER — SODIUM CHLORIDE, SODIUM LACTATE, POTASSIUM CHLORIDE, CALCIUM CHLORIDE 600; 310; 30; 20 MG/100ML; MG/100ML; MG/100ML; MG/100ML
30 INJECTION, SOLUTION INTRAVENOUS CONTINUOUS
Status: DISCONTINUED | OUTPATIENT
Start: 2024-02-12 | End: 2024-02-12 | Stop reason: HOSPADM

## 2024-02-12 RX ORDER — PROPOFOL 10 MG/ML
VIAL (ML) INTRAVENOUS AS NEEDED
Status: DISCONTINUED | OUTPATIENT
Start: 2024-02-12 | End: 2024-02-12 | Stop reason: SURG

## 2024-02-12 RX ORDER — ONDANSETRON 2 MG/ML
4 INJECTION INTRAMUSCULAR; INTRAVENOUS ONCE
Status: COMPLETED | OUTPATIENT
Start: 2024-02-12 | End: 2024-02-12

## 2024-02-12 RX ADMIN — LIDOCAINE HYDROCHLORIDE 50 MG: 20 INJECTION, SOLUTION EPIDURAL; INFILTRATION; INTRACAUDAL; PERINEURAL at 10:12

## 2024-02-12 RX ADMIN — PROPOFOL 60 MG: 10 INJECTION, EMULSION INTRAVENOUS at 10:12

## 2024-02-12 RX ADMIN — ONDANSETRON 4 MG: 2 INJECTION INTRAMUSCULAR; INTRAVENOUS at 09:51

## 2024-02-12 RX ADMIN — PROPOFOL 200 MCG/KG/MIN: 10 INJECTION, EMULSION INTRAVENOUS at 10:12

## 2024-02-12 RX ADMIN — SODIUM CHLORIDE, POTASSIUM CHLORIDE, SODIUM LACTATE AND CALCIUM CHLORIDE 30 ML/HR: 600; 310; 30; 20 INJECTION, SOLUTION INTRAVENOUS at 09:51

## 2024-02-13 ENCOUNTER — TELEPHONE (OUTPATIENT)
Dept: GASTROENTEROLOGY | Facility: CLINIC | Age: 54
End: 2024-02-13
Payer: COMMERCIAL

## 2024-05-01 ENCOUNTER — TRANSCRIBE ORDERS (OUTPATIENT)
Dept: ADMINISTRATIVE | Facility: HOSPITAL | Age: 54
End: 2024-05-01
Payer: COMMERCIAL

## 2024-05-01 DIAGNOSIS — Z12.31 SCREENING MAMMOGRAM FOR HIGH-RISK PATIENT: Primary | ICD-10-CM

## 2024-06-14 ENCOUNTER — HOSPITAL ENCOUNTER (OUTPATIENT)
Dept: MAMMOGRAPHY | Facility: HOSPITAL | Age: 54
Discharge: HOME | End: 2024-06-14
Admitting: NURSE PRACTITIONER
Payer: COMMERCIAL

## 2024-06-14 DIAGNOSIS — Z12.31 SCREENING MAMMOGRAM FOR HIGH-RISK PATIENT: ICD-10-CM

## 2024-06-14 PROCEDURE — 77067 SCR MAMMO BI INCL CAD: CPT

## 2024-06-14 PROCEDURE — 77063 BREAST TOMOSYNTHESIS BI: CPT

## 2025-06-06 ENCOUNTER — TRANSCRIBE ORDERS (OUTPATIENT)
Dept: ADMINISTRATIVE | Facility: HOSPITAL | Age: 55
End: 2025-06-06
Payer: COMMERCIAL

## 2025-06-06 DIAGNOSIS — Z78.0 ASYMPTOMATIC MENOPAUSAL STATE: Primary | ICD-10-CM

## 2025-06-06 DIAGNOSIS — Z12.31 VISIT FOR SCREENING MAMMOGRAM: ICD-10-CM

## 2025-07-07 ENCOUNTER — HOSPITAL ENCOUNTER (OUTPATIENT)
Dept: MAMMOGRAPHY | Facility: HOSPITAL | Age: 55
Discharge: HOME | End: 2025-07-07
Payer: COMMERCIAL

## 2025-07-07 ENCOUNTER — HOSPITAL ENCOUNTER (OUTPATIENT)
Dept: BONE DENSITY | Facility: HOSPITAL | Age: 55
Discharge: HOME | End: 2025-07-07
Payer: COMMERCIAL

## 2025-07-07 DIAGNOSIS — Z12.31 VISIT FOR SCREENING MAMMOGRAM: ICD-10-CM

## 2025-07-07 DIAGNOSIS — Z78.0 ASYMPTOMATIC MENOPAUSAL STATE: ICD-10-CM

## 2025-07-07 PROCEDURE — 77080 DXA BONE DENSITY AXIAL: CPT

## 2025-07-07 PROCEDURE — 77067 SCR MAMMO BI INCL CAD: CPT

## 2025-07-07 PROCEDURE — 77063 BREAST TOMOSYNTHESIS BI: CPT

## (undated) DEVICE — SOLIDIFIER LIQLOC PLS 1500CC BT

## (undated) DEVICE — GENERAL LAPAROSCOPY-LF: Brand: MEDLINE INDUSTRIES, INC.

## (undated) DEVICE — SOL IRRG H2O PL/BG 1000ML STRL

## (undated) DEVICE — ENDOPATH XCEL WITH OPTIVIEW TECHNOLOGY BLADELESS TROCARS WITH STABILITY SLEEVES: Brand: ENDOPATH XCEL OPTIVIEW

## (undated) DEVICE — GOWN,REINFORCE,POLY,SIRUS,BREATH SLV,XLG: Brand: MEDLINE

## (undated) DEVICE — Device

## (undated) DEVICE — 3M™ STERI-STRIP™ REINFORCED ADHESIVE SKIN CLOSURES, R1547, 1/2 IN X 4 IN (12 MM X 100 MM), 6 STRIPS/ENVELOPE: Brand: 3M™ STERI-STRIP™

## (undated) DEVICE — LINER SURG CANSTR SXN S/RIGD 1500CC

## (undated) DEVICE — SNAR E/S POLYP SNAREMASTER OVL/10MM 2.8X2300MM YEL

## (undated) DEVICE — NON-WOVEN ADHESIVE WOUND DRESSING: Brand: PRIMAPORE ADHESIVE DRESSING 10*8CM

## (undated) DEVICE — STERILE POLYISOPRENE POWDER-FREE SURGICAL GLOVES WITH EMOLLIENT COATING: Brand: PROTEXIS

## (undated) DEVICE — CONN JET HYDRA H20 AUXILIARY DISP

## (undated) DEVICE — TISSUE RETRIEVAL SYSTEM: Brand: INZII RETRIEVAL SYSTEM

## (undated) DEVICE — TRAP POLYP ETRAP 2PK

## (undated) DEVICE — LAPAROSCOPIC SCISSORS: Brand: EPIX LAPAROSCOPIC SCISSORS

## (undated) DEVICE — PENCL E/S SMOKEEVAC W/TELESCP CANN

## (undated) DEVICE — ELECTRD BLD EDGE COAT 3IN

## (undated) DEVICE — SOL IRR NACL 0.9PCT 3000ML

## (undated) DEVICE — SUT MERSILENE POLYSTR UR6 BR 0 75CM GRN

## (undated) DEVICE — ANTIBACTERIAL UNDYED BRAIDED (POLYGLACTIN 910), SYNTHETIC ABSORBABLE SUTURE: Brand: COATED VICRYL

## (undated) DEVICE — ENDOPATH XCEL WITH OPTIVIEW TECHNOLOGY UNIVERSAL TROCAR STABILITY SLEEVES: Brand: ENDOPATH XCEL OPTIVIEW

## (undated) DEVICE — ENDOPATH PNEUMONEEDLE INSUFFLATION NEEDLES WITH LUER LOCK CONNECTORS 120MM: Brand: ENDOPATH

## (undated) DEVICE — 30977 SEE SHARP - ENHANCED INTRAOPERATIVE LAPAROSCOPE CLEANING & DEFOGGING: Brand: 30977 SEE SHARP - ENHANCED INTRAOPERATIVE LAPAROSCOPE CLEANING & DEFOGGING

## (undated) DEVICE — 2, DISPOSABLE SUCTION/IRRIGATOR WITHOUT DISPOSABLE TIP: Brand: STRYKEFLOW

## (undated) DEVICE — NON-WOVEN ADHESIVE WOUND DRESSING: Brand: PRIMAPORE ADHESIVE WOUND DRSG 7.2*5CM

## (undated) DEVICE — ADHS LIQ MASTISOL 2/3ML

## (undated) DEVICE — SOL IRR NACL 0.9PCT BT 1000ML

## (undated) DEVICE — KT VLV BIOP DEFENDO SXN AIR/WATER

## (undated) DEVICE — SLV SCD KN/LEN ADJ EXPRSS BLENDED MD 1P/U